# Patient Record
Sex: MALE | Race: BLACK OR AFRICAN AMERICAN | Employment: FULL TIME | RURAL
[De-identification: names, ages, dates, MRNs, and addresses within clinical notes are randomized per-mention and may not be internally consistent; named-entity substitution may affect disease eponyms.]

---

## 2018-01-04 ENCOUNTER — TELEPHONE (OUTPATIENT)
Dept: FAMILY MEDICINE CLINIC | Age: 38
End: 2018-01-04

## 2018-01-04 ENCOUNTER — OFFICE VISIT (OUTPATIENT)
Dept: FAMILY MEDICINE CLINIC | Age: 38
End: 2018-01-04

## 2018-01-04 VITALS
OXYGEN SATURATION: 97 % | RESPIRATION RATE: 18 BRPM | TEMPERATURE: 99.4 F | HEART RATE: 71 BPM | HEIGHT: 74 IN | WEIGHT: 245 LBS | SYSTOLIC BLOOD PRESSURE: 113 MMHG | BODY MASS INDEX: 31.44 KG/M2 | DIASTOLIC BLOOD PRESSURE: 56 MMHG

## 2018-01-04 DIAGNOSIS — K13.79 INFECTION OF BUCCAL SPACE: ICD-10-CM

## 2018-01-04 DIAGNOSIS — K04.7 DENTAL ABSCESS: Primary | ICD-10-CM

## 2018-01-04 RX ORDER — CIPROFLOXACIN 500 MG/1
500 TABLET ORAL 2 TIMES DAILY
Qty: 20 TAB | Refills: 0 | Status: SHIPPED | OUTPATIENT
Start: 2018-01-04 | End: 2018-01-14

## 2018-01-04 RX ORDER — METRONIDAZOLE 500 MG/1
500 TABLET ORAL 3 TIMES DAILY
Qty: 21 TAB | Refills: 0 | Status: SHIPPED | OUTPATIENT
Start: 2018-01-04 | End: 2018-01-11

## 2018-01-04 RX ORDER — PENICILLIN V POTASSIUM 500 MG/1
500 TABLET, FILM COATED ORAL 4 TIMES DAILY
Qty: 28 TAB | Refills: 0 | Status: SHIPPED | OUTPATIENT
Start: 2018-01-04 | End: 2018-01-11

## 2018-01-04 RX ORDER — LIDOCAINE HYDROCHLORIDE 20 MG/ML
15 SOLUTION OROPHARYNGEAL AS NEEDED
Qty: 1 BOTTLE | Refills: 0 | Status: SHIPPED | OUTPATIENT
Start: 2018-01-04 | End: 2018-06-19 | Stop reason: ALTCHOICE

## 2018-01-04 RX ORDER — AMOXICILLIN AND CLAVULANATE POTASSIUM 875; 125 MG/1; MG/1
1 TABLET, FILM COATED ORAL EVERY 12 HOURS
Qty: 20 TAB | Refills: 0 | Status: SHIPPED | OUTPATIENT
Start: 2018-01-04 | End: 2018-01-04 | Stop reason: ALTCHOICE

## 2018-01-04 NOTE — PROGRESS NOTES
Office called by patient and there was an issue with augmentin at the pharmacy.   Will give PCN and flagyl for equivalent coverage to the augmentin that Dr. Gila Daley prescribed today

## 2018-01-04 NOTE — PROGRESS NOTES
Isaias Lopez  42 y.o. male  1980  [phone]  <[id number]>     [facility] Practice: Progress Note       Encounter Date: 1/4/2018    Chief Complaint   Patient presents with    Mouth Pain     Cheek abcess? History of Present Illness   Isaias Lopez is a 42 y.o. male who presents to clinic today for:    Cheek pain  Patient presents with cc of cheek pain x 3 days. Believes that his wisdom tooth cut the right side of buccal mucosa an has become enlarge and. He has placing gel  \"kankanx\" on the area which numbs that area for approx 30-40 minutes. Denies fever, chills. Endorses possible drainage from the area and \"funny taste\" in his mouth. Review of Systems   Review of Systems   Constitutional: Negative for chills and fever. HENT: Positive for sore throat (mouth pain). Eyes: Negative for pain and discharge. Respiratory: Negative for cough, shortness of breath and wheezing. Gastrointestinal: Negative for abdominal pain, constipation, diarrhea, nausea and vomiting. Skin: Negative for itching and rash. Neurological: Negative for dizziness and headaches. Vitals/Objective:     Vitals:    01/04/18 1057   BP: 113/56   Pulse: 71   Resp: 18   Temp: 99.4 °F (37.4 °C)   TempSrc: Oral   SpO2: 97%   Weight: 245 lb (111.1 kg)   Height: 6' 2\" (1.88 m)     Body mass index is 31.46 kg/(m^2). Physical Exam   Constitutional: He is oriented to person, place, and time. He appears well-developed and well-nourished. No distress. HENT:   Head: Normocephalic and atraumatic. Mouth/Throat: Dental abscesses and dental caries present. No oropharyngeal exudate, posterior oropharyngeal edema, posterior oropharyngeal erythema or tonsillar abscesses. Eyes: Conjunctivae are normal. Pupils are equal, round, and reactive to light. Neck: Normal range of motion. Neck supple. Cardiovascular: Normal rate and regular rhythm.     Pulmonary/Chest: Breath sounds normal. Lymphadenopathy:        Head (right side): Submandibular and tonsillar adenopathy present. No preauricular and no occipital adenopathy present. Head (left side): No submental, no submandibular, no tonsillar, no preauricular, no posterior auricular and no occipital adenopathy present. He has no cervical adenopathy. Neurological: He is alert and oriented to person, place, and time. No results found for this or any previous visit (from the past 24 hour(s)). Assessment and Plan:     Encounter Diagnoses     ICD-10-CM ICD-9-CM   1. Dental abscess K04.7 522.5   2. Infection of buccal space K13.79 528.9       1. Dental abscess  2. Infection of buccal space  Advised patient to take abx and seek dentist for treatment of dental caries otherwise highly likely to reoccur.   - amoxicillin-clavulanate (AUGMENTIN) 875-125 mg per tablet; Take 1 Tab by mouth every twelve (12) hours for 10 days. Dispense: 20 Tab; Refill: 0  - lidocaine (XYLOCAINE) 2 % solution; Take 15 mL by mouth as needed for Pain. Swish and spit. Dispense: 1 Bottle; Refill: 0    I have discussed the diagnosis with the patient and the intended plan as seen in the above orders. he has expressed understanding. The patient has received an after-visit summary and questions were answered concerning future plans. I have discussed medication side effects and warnings with the patient as well. Electronically Signed: Christine Lara MD     History/Allergies   Patients past medical, surgical and family histories were reviewed and updated. History reviewed. No pertinent past medical history. History reviewed. No pertinent surgical history. Family History   Problem Relation Age of Onset    Cancer Mother      Social History     Social History    Marital status:      Spouse name: N/A    Number of children: N/A    Years of education: N/A     Occupational History    Not on file.      Social History Main Topics    Smoking status: Current Every Day Smoker     Packs/day: 0.25     Years: 15.00    Smokeless tobacco: Never Used    Alcohol use Yes      Comment: social    Drug use: No      Comment: use to smoke marijuana years ago    Sexual activity: Not Currently     Other Topics Concern    Not on file     Social History Narrative    ** Merged History Encounter **              No Known Allergies    Disposition     Follow-up Disposition:  Return if symptoms worsen or fail to improve. No future appointments. Current Medications after this visit     Current Outpatient Prescriptions   Medication Sig    amoxicillin-clavulanate (AUGMENTIN) 875-125 mg per tablet Take 1 Tab by mouth every twelve (12) hours for 10 days.  lidocaine (XYLOCAINE) 2 % solution Take 15 mL by mouth as needed for Pain. Swish and spit.  podofilox (CONDYLOX) 0.5 % external solution Apply twice daily to affected areas for 3 days. Then do not use for 4 days. Repeat this cycle up to 4 times until there are no more warts. No current facility-administered medications for this visit. There are no discontinued medications.

## 2018-01-04 NOTE — MR AVS SNAPSHOT
Visit Information Date & Time Provider Department Dept. Phone Encounter #  
 1/4/2018 10:40 AM Chacho Stern MD [address] [place] [address] [id number] Follow-up Instructions Return if symptoms worsen or fail to improve. Upcoming Health Maintenance Date Due Pneumococcal 19-64 Medium Risk (1 of 1 - PPSV23) 9/16/1997 DTaP/Tdap/Td series (1 - Tdap) 9/16/1999 Influenza Age 5 to Adult 8/1/2017 Allergies as of 1/4/2018  Review Complete On: 1/4/2018 By: Chacho Stern MD  
 No Known Allergies Current Immunizations  Never Reviewed No immunizations on file. Not reviewed this visit You Were Diagnosed With   
  
 Codes Comments Dental abscess    -  Primary ICD-10-CM: K04.7 ICD-9-CM: 522.5 Infection of buccal space     ICD-10-CM: K13.79 ICD-9-CM: 528.9 Vitals BP Pulse Temp Resp Height(growth percentile) Weight(growth percentile) 113/56 71 99.4 °F (37.4 °C) (Oral) 18 6' 2\" (1.88 m) 245 lb (111.1 kg) SpO2 BMI Smoking Status 97% 31.46 kg/m2 Current Every Day Smoker Vitals History BMI and BSA Data Body Mass Index Body Surface Area  
 31.46 kg/m 2 2.41 m 2 Preferred Pharmacy Pharmacy Name Phone [address] [place] [address] [phone] Your Updated Medication List  
  
   
This list is accurate as of: 1/4/18 11:21 AM.  Always use your most recent med list.  
  
  
  
  
 amoxicillin-clavulanate 875-125 mg per tablet Commonly known as:  AUGMENTIN Take 1 Tab by mouth every twelve (12) hours for 10 days. podofilox 0.5 % external solution Commonly known as:  Dora Certain Apply twice daily to affected areas for 3 days. Then do not use for 4 days. Repeat this cycle up to 4 times until there are no more warts. Prescriptions Sent to Pharmacy Refills amoxicillin-clavulanate (AUGMENTIN) 875-125 mg per tablet 0 Sig: Take 1 Tab by mouth every twelve (12) hours for 10 days. Class: Normal  
 Pharmacy: [facility] DR IVAN BROWN [address] [place] [address] #: [phone] Route: Oral  
  
Follow-up Instructions Return if symptoms worsen or fail to improve. Introducing [facility]! Yeny Celaya introduces Blizuu patient portal. Now you can access parts of your medical record, email your doctor's office, and request medication refills online. 1. In your internet browser, go to [email or web address] 2. Click on the First Time User? Click Here link in the Sign In box. You will see the New Member Sign Up page. 3. Enter your Blizuu Access Code exactly as it appears below. You will not need to use this code after youve completed the sign-up process. If you do not sign up before the expiration date, you must request a new code. · Blizuu Access Code: [email or web address] Expires: 4/4/2018 10:53 AM 
 
4. Enter the last four digits of your Social Security Number (xxxx) and Date of Birth (mm/dd/yyyy) as indicated and click Submit. You will be taken to the next sign-up page. 5. Create a Blizuu ID. This will be your Blizuu login ID and cannot be changed, so think of one that is secure and easy to remember. 6. Create a Blizuu password. You can change your password at any time. 7. Enter your Password Reset Question and Answer. This can be used at a later time if you forget your password. 8. Enter your e-mail address. You will receive e-mail notification when new information is available in [address] 9. Click Sign Up. You can now view and download portions of your medical record. 10. Click the Download Summary menu link to download a portable copy of your medical information.  
 
If you have questions, please visit the Frequently Asked Questions section of the [employer]. Remember, Eyepict is NOT to be used for urgent needs. For medical emergencies, dial 911. Now available from your iPhone and Android! Please provide this summary of care documentation to your next provider. Your primary care clinician is listed as NONE. If you have any questions after today's visit, please call [phone].

## 2018-01-04 NOTE — PROGRESS NOTES
1. Have you been to the ER, urgent care clinic, or been hospitalized since your last visit? No      2. Have you seen or consulted any other health care providers outside of the [address] [place] [address] since your last visit?   No   Reviewed record in preparation for visit and have necessary documentation  opportunity was given for questions  Goals that were addressed and/or need to be completed during or after this appointment include     Health Maintenance Due   Topic Date Due    Pneumococcal 19-64 Medium Risk (1 of 1 - PPSV23) 09/16/1997    DTaP/Tdap/Td series (1 - Tdap) 09/16/1999    Influenza Age 9 to Adult  08/01/2017

## 2018-06-19 ENCOUNTER — OFFICE VISIT (OUTPATIENT)
Dept: FAMILY MEDICINE CLINIC | Age: 38
End: 2018-06-19

## 2018-06-19 VITALS
BODY MASS INDEX: 31.32 KG/M2 | HEIGHT: 74 IN | SYSTOLIC BLOOD PRESSURE: 109 MMHG | TEMPERATURE: 98.6 F | WEIGHT: 244 LBS | OXYGEN SATURATION: 97 % | RESPIRATION RATE: 18 BRPM | DIASTOLIC BLOOD PRESSURE: 71 MMHG | HEART RATE: 102 BPM

## 2018-06-19 DIAGNOSIS — S05.02XA ABRASION OF LEFT CORNEA, INITIAL ENCOUNTER: Primary | ICD-10-CM

## 2018-06-19 DIAGNOSIS — H10.9 BACTERIAL CONJUNCTIVITIS OF LEFT EYE: ICD-10-CM

## 2018-06-19 RX ORDER — ERYTHROMYCIN 5 MG/G
OINTMENT OPHTHALMIC
Qty: 1 TUBE | Refills: 0 | Status: SHIPPED | OUTPATIENT
Start: 2018-06-19 | End: 2019-02-05

## 2018-06-19 NOTE — PATIENT INSTRUCTIONS
Corneal Scratches: Care Instructions  Your Care Instructions    The cornea is the clear surface that covers the front of the eye. When a speck of dirt, a wood chip, an insect, or another object flies into your eye, it can cause a painful scratch on the cornea. Wearing contact lenses too long or rubbing your eyes can also scratch the cornea. Small scratches usually heal in a day or two. Deeper scratches may take longer. If you have had a foreign object removed from your eye or you have a corneal scratch, you will need to watch for infection and vision problems while your eye heals. Follow-up care is a key part of your treatment and safety. Be sure to make and go to all appointments, and call your doctor if you are having problems. It's also a good idea to know your test results and keep a list of the medicines you take. How can you care for yourself at home? · The doctor probably used a medicine during your exam to numb your eye. When it wears off in 30 to 60 minutes, your eye pain may come back. Take pain medicines exactly as directed. ¨ If the doctor gave you a prescription medicine for pain, take it as prescribed. ¨ If you are not taking a prescription pain medicine, ask your doctor if you can take an over-the-counter medicine. ¨ Do not take two or more pain medicines at the same time unless the doctor told you to. Many pain medicines have acetaminophen, which is Tylenol. Too much acetaminophen (Tylenol) can be harmful. · Do not rub your injured eye. Rubbing can make it worse. · Use the prescribed eyedrops or ointment as directed. Be sure the dropper or bottle tip is clean. To put in eyedrops or ointment:  ¨ Tilt your head back, and pull your lower eyelid down with one finger. ¨ Drop or squirt the medicine inside the lower lid. ¨ Close your eye for 30 to 60 seconds to let the drops or ointment move around. ¨ Do not touch the ointment or dropper tip to your eyelashes or any other surface.   · Do not use your contact lens in your hurt eye until your doctor says you can. Also, do not wear eye makeup until your eye has healed. · Do not drive if you have blurred vision. · Bright light may hurt. Sunglasses can help. · To prevent eye injuries in the future, wear safety glasses or goggles when you work with machines or tools, mow the lawn, or ride a bike or motorcycle. When should you call for help? Call your doctor now or seek immediate medical care if:  ? · You have signs of an eye infection, such as:  ¨ Pus or thick discharge coming from the eye. ¨ Redness or swelling around the eye. ¨ A fever. ? · You have new or worse eye pain. ? · You have vision changes. ? · It feels like there is something in your eye. ? · Light hurts your eye. ? Watch closely for changes in your health, and be sure to contact your doctor if:  ? · You do not get better as expected. Where can you learn more? Go to http://hayder-treasure.info/. Enter K609 in the search box to learn more about \"Corneal Scratches: Care Instructions. \"  Current as of: March 3, 2017  Content Version: 11.4  © 7078-4797 [employer]. Care instructions adapted under license by [employer] (which disclaims liability or warranty for this information). If you have questions about a medical condition or this instruction, always ask your healthcare professional. [place] [address] any warranty or liability for your use of this information. Pinkeye: Care Instructions  Your Care Instructions    Pinkeye is redness and swelling of the eye surface and the conjunctiva (the lining of the eyelid and the covering of the white part of the eye). Pinkeye is also called conjunctivitis. Pinkeye is often caused by infection with bacteria or a virus. Dry air, allergies, smoke, and chemicals are other common causes. Pinkeye often clears on its own in 7 to 10 days.  Antibiotics only help if the pinkeye is caused by bacteria. Pinkeye caused by infection spreads easily. If an allergy or chemical is causing pinkeye, it will not go away unless you can avoid whatever is causing it. Follow-up care is a key part of your treatment and safety. Be sure to make and go to all appointments, and call your doctor if you are having problems. It's also a good idea to know your test results and keep a list of the medicines you take. How can you care for yourself at home? · Wash your hands often. Always wash them before and after you treat pinkeye or touch your eyes or face. · Use moist cotton or a clean, wet cloth to remove crust. Wipe from the inside corner of the eye to the outside. Use a clean part of the cloth for each wipe. · Put cold or warm wet cloths on your eye a few times a day if the eye hurts. · Do not wear contact lenses or eye makeup until the pinkeye is gone. Throw away any eye makeup you were using when you got pinkeye. Clean your contacts and storage case. If you wear disposable contacts, use a new pair when your eye has cleared and it is safe to wear contacts again. · If the doctor gave you antibiotic ointment or eyedrops, use them as directed. Use the medicine for as long as instructed, even if your eye starts looking better soon. Keep the bottle tip clean, and do not let it touch the eye area. · To put in eyedrops or ointment:  ¨ Tilt your head back, and pull your lower eyelid down with one finger. ¨ Drop or squirt the medicine inside the lower lid. ¨ Close your eye for 30 to 60 seconds to let the drops or ointment move around. ¨ Do not touch the ointment or dropper tip to your eyelashes or any other surface. · Do not share towels, pillows, or washcloths while you have pinkeye. When should you call for help? Call your doctor now or seek immediate medical care if:  ? · You have pain in your eye, not just irritation on the surface. ? · You have a change in vision or loss of vision.    ? · You have an increase in discharge from the eye.   ? · Your eye has not started to improve or begins to get worse within 48 hours after you start using antibiotics. ? · Pinkeye lasts longer than 7 days. ? Watch closely for changes in your health, and be sure to contact your doctor if you have any problems. Where can you learn more? Go to http://hayder-treasure.info/. Enter Y392 in the search box to learn more about \"Pinkeye: Care Instructions. \"  Current as of: March 20, 2017  Content Version: 11.4  © 8575-7889 [employer]. Care instructions adapted under license by [employer] (which disclaims liability or warranty for this information). If you have questions about a medical condition or this instruction, always ask your healthcare professional. [address] any warranty or liability for your use of this information.

## 2018-06-19 NOTE — MR AVS SNAPSHOT
[address] [address] 
[phone] Patient: Ang Naik MRN: [id number] DHV:[id number] Visit Information Date & Time Provider Department Dept. Phone Encounter #  
 6/19/2018 10:30 AM Dayron Montesinos  [facility] [phone] [id number] Follow-up Instructions Return if symptoms worsen or fail to improve. Upcoming Health Maintenance Date Due Pneumococcal 19-64 Medium Risk (1 of 1 - PPSV23) 9/16/1997 DTaP/Tdap/Td series (1 - Tdap) 9/16/1999 Influenza Age 5 to Adult 8/1/2018 Allergies as of 6/19/2018  Review Complete On: 6/19/2018 By: Janette Pederson LPN No Known Allergies Current Immunizations  Never Reviewed No immunizations on file. Not reviewed this visit You Were Diagnosed With   
  
 Codes Comments Abrasion of left cornea, initial encounter    -  Primary ICD-10-CM: S05. 02XA ICD-9-CM: 918.1 Bacterial conjunctivitis of left eye     ICD-10-CM: H10.9 ICD-9-CM: 372.39, 041.9 Vitals BP Pulse Temp Resp Height(growth percentile) Weight(growth percentile) 109/71 (!) 102 98.6 °F (37 °C) (Oral) 18 6' 2\" (1.88 m) 244 lb (110.7 kg) SpO2 BMI Smoking Status 97% 31.33 kg/m2 Current Every Day Smoker Vitals History BMI and BSA Data Body Mass Index Body Surface Area  
 31.33 kg/m 2 2.4 m 2 Preferred Pharmacy Pharmacy Name Phone [address] [address] [phone] Your Updated Medication List  
  
   
This list is accurate as of 6/19/18 11:20 AM.  Always use your most recent med list.  
  
  
  
  
 erythromycin ophthalmic ointment Commonly known as:  ILOTYCIN Instill ~1 cm ribbon into left eye up to 4 times daily for 10 days. lidocaine 2 % solution Commonly known as:  XYLOCAINE  
 Take 15 mL by mouth as needed for Pain. Swish and spit.  
  
 podofilox 0.5 % external solution Commonly known as:  Lindy Brandon Apply twice daily to affected areas for 3 days. Then do not use for 4 days. Repeat this cycle up to 4 times until there are no more warts. Prescriptions Sent to Pharmacy Refills  
 erythromycin (ILOTYCIN) ophthalmic ointment 0 Sig: Instill ~1 cm ribbon into left eye up to 4 times daily for 10 days. Class: Normal  
 Pharmacy: [address] [place] [address] #: [phone] Follow-up Instructions Return if symptoms worsen or fail to improve. Patient Instructions Corneal Scratches: Care Instructions Your Care Instructions The cornea is the clear surface that covers the front of the eye. When a speck of dirt, a wood chip, an insect, or another object flies into your eye, it can cause a painful scratch on the cornea. Wearing contact lenses too long or rubbing your eyes can also scratch the cornea. Small scratches usually heal in a day or two. Deeper scratches may take longer. If you have had a foreign object removed from your eye or you have a corneal scratch, you will need to watch for infection and vision problems while your eye heals. Follow-up care is a key part of your treatment and safety. Be sure to make and go to all appointments, and call your doctor if you are having problems. It's also a good idea to know your test results and keep a list of the medicines you take. How can you care for yourself at home? · The doctor probably used a medicine during your exam to numb your eye. When it wears off in 30 to 60 minutes, your eye pain may come back. Take pain medicines exactly as directed. ¨ If the doctor gave you a prescription medicine for pain, take it as prescribed. ¨ If you are not taking a prescription pain medicine, ask your doctor if you can take an over-the-counter medicine. ¨ Do not take two or more pain medicines at the same time unless the doctor told you to. Many pain medicines have acetaminophen, which is Tylenol. Too much acetaminophen (Tylenol) can be harmful. · Do not rub your injured eye. Rubbing can make it worse. · Use the prescribed eyedrops or ointment as directed. Be sure the dropper or bottle tip is clean. To put in eyedrops or ointment: ¨ Tilt your head back, and pull your lower eyelid down with one finger. ¨ Drop or squirt the medicine inside the lower lid. ¨ Close your eye for 30 to 60 seconds to let the drops or ointment move around. ¨ Do not touch the ointment or dropper tip to your eyelashes or any other surface. · Do not use your contact lens in your hurt eye until your doctor says you can. Also, do not wear eye makeup until your eye has healed. · Do not drive if you have blurred vision. · Bright light may hurt. Sunglasses can help. · To prevent eye injuries in the future, wear safety glasses or goggles when you work with machines or tools, mow the lawn, or ride a bike or motorcycle. When should you call for help? Call your doctor now or seek immediate medical care if: 
? · You have signs of an eye infection, such as: 
¨ Pus or thick discharge coming from the eye. ¨ Redness or swelling around the eye. ¨ A fever. ? · You have new or worse eye pain. ? · You have vision changes. ? · It feels like there is something in your eye. ? · Light hurts your eye. ? Watch closely for changes in your health, and be sure to contact your doctor if: 
? · You do not get better as expected. Where can you learn more? Go to http://hayder-treasure.info/. Enter B123 in the search box to learn more about \"Corneal Scratches: Care Instructions. \" Current as of: March 3, 2017 Content Version: 11.4 © 8493-2108 [employer].  Care instructions adapted under license by [employer] (which disclaims liability or warranty for this information). If you have questions about a medical condition or this instruction, always ask your healthcare professional. [address] any warranty or liability for your use of this information. Pinkeye: Care Instructions Your Care Instructions Pinkeye is redness and swelling of the eye surface and the conjunctiva (the lining of the eyelid and the covering of the white part of the eye). Pinkeye is also called conjunctivitis. Pinkeye is often caused by infection with bacteria or a virus. Dry air, allergies, smoke, and chemicals are other common causes. Pinkeye often clears on its own in 7 to 10 days. Antibiotics only help if the pinkeye is caused by bacteria. Pinkeye caused by infection spreads easily. If an allergy or chemical is causing pinkeye, it will not go away unless you can avoid whatever is causing it. Follow-up care is a key part of your treatment and safety. Be sure to make and go to all appointments, and call your doctor if you are having problems. It's also a good idea to know your test results and keep a list of the medicines you take. How can you care for yourself at home? · Wash your hands often. Always wash them before and after you treat pinkeye or touch your eyes or face. · Use moist cotton or a clean, wet cloth to remove crust. Wipe from the inside corner of the eye to the outside. Use a clean part of the cloth for each wipe. · Put cold or warm wet cloths on your eye a few times a day if the eye hurts. · Do not wear contact lenses or eye makeup until the pinkeye is gone. Throw away any eye makeup you were using when you got pinkeye. Clean your contacts and storage case. If you wear disposable contacts, use a new pair when your eye has cleared and it is safe to wear contacts again. · If the doctor gave you antibiotic ointment or eyedrops, use them as directed.  Use the medicine for as long as instructed, even if your eye starts looking better soon. Keep the bottle tip clean, and do not let it touch the eye area. · To put in eyedrops or ointment: ¨ Tilt your head back, and pull your lower eyelid down with one finger. ¨ Drop or squirt the medicine inside the lower lid. ¨ Close your eye for 30 to 60 seconds to let the drops or ointment move around. ¨ Do not touch the ointment or dropper tip to your eyelashes or any other surface. · Do not share towels, pillows, or washcloths while you have pinkeye. When should you call for help? Call your doctor now or seek immediate medical care if: 
? · You have pain in your eye, not just irritation on the surface. ? · You have a change in vision or loss of vision. ? · You have an increase in discharge from the eye.  
? · Your eye has not started to improve or begins to get worse within 48 hours after you start using antibiotics. ? · Pinkeye lasts longer than 7 days. ? Watch closely for changes in your health, and be sure to contact your doctor if you have any problems. Where can you learn more? Go to http://hayder-treasure.info/. Enter Y392 in the search box to learn more about \"Pinkeye: Care Instructions. \" Current as of: March 20, 2017 Content Version: 11.4 © 2223-9830 [employer]wise[employer]. Care instructions adapted under license by [employer] (which disclaims liability or warranty for this information). If you have questions about a medical condition or this instruction, always ask your healthcare professional. [address] [address] any warranty or liability for your use of this information. Introducing [facility]! Hitesh Kuhn introduces BlueLithium patient portal. Now you can access parts of your medical record, email your doctor's office, and request medication refills online. 1. In your internet browser, go to [email or web address] 2. Click on the First Time User? Click Here link in the Sign In box. You will see the New Member Sign Up page. 3. Enter your [facility] Access Code exactly as it appears below. You will not need to use this code after youve completed the sign-up process. If you do not sign up before the expiration date, you must request a new code. · [facility] Access Code: [email or web address] Expires: 9/17/2018 11:20 AM 
 
4. Enter the last four digits of your Social Security Number (xxxx) and Date of Birth (mm/dd/yyyy) as indicated and click Submit. You will be taken to the next sign-up page. 5. Create a [facility] ID. This will be your [facility] login ID and cannot be changed, so think of one that is secure and easy to remember. 6. Create a [facility] password. You can change your password at any time. 7. Enter your Password Reset Question and Answer. This can be used at a later time if you forget your password. 8. Enter your e-mail address. You will receive e-mail notification when new information is available in [address] 9. Click Sign Up. You can now view and download portions of your medical record. 10. Click the Download Summary menu link to download a portable copy of your medical information. If you have questions, please visit the Frequently Asked Questions section of the [facility] website. Remember, [facility] is NOT to be used for urgent needs. For medical emergencies, dial 911. Now available from your iPhone and Android! Please provide this summary of care documentation to your next provider. Your primary care clinician is listed as NONE. If you have any questions after today's visit, please call [phone].

## 2018-06-19 NOTE — PROGRESS NOTES
1. Have you been to the ER, urgent care clinic, or been hospitalized since your last visit? No     2. Have you seen or consulted any other health care providers outside of the [facility] [place] since your last visit?   No     Reviewed record in preparation for visit and have necessary documentation  opportunity was given for questions  Goals that were addressed and/or need to be completed during or after this appointment include     Health Maintenance Due   Topic Date Due    Pneumococcal 19-64 Medium Risk (1 of 1 - PPSV23) 09/16/1997    DTaP/Tdap/Td series (1 - Tdap) 09/16/1999

## 2018-06-19 NOTE — PROGRESS NOTES
Rosemary Cardenas  42 y.o. male  1980 [address] [facility]  <[id number]>     [facility] Practice: Progress Note       Encounter Date: 6/19/2018    Chief Complaint   Patient presents with    Eye Drainage     Left eye drainage x2 days, redness (did remember he scratched his eye with his fingernail the other day)       History provided by patient  History of Present Illness   Rosemary Cardenas is a 42 y.o. male who presents to clinic today for:    Left eye drainage  Patient present with cc of left eye drainage x 2 days. Reports that prior to sympomts starting, he thought that he scratched his left eye with his nail. Since then has been having sensation of foreign body in eye, drainage and redness. Had attempted to irrigate eye with water with minimal improvement. Review of Systems   Review of Systems   Constitutional: Negative for chills and fever. Eyes: Positive for pain, discharge and redness. Skin: Negative for itching and rash. Neurological: Negative for headaches. Vitals/Objective:     Vitals:    06/19/18 1053   BP: 109/71   Pulse: (!) 102   Resp: 18   Temp: 98.6 °F (37 °C)   TempSrc: Oral   SpO2: 97%   Weight: 244 lb (110.7 kg)   Height: 6' 2\" (1.88 m)     Body mass index is 31.33 kg/(m^2). Wt Readings from Last 3 Encounters:   06/19/18 244 lb (110.7 kg)   01/04/18 245 lb (111.1 kg)   07/13/16 245 lb (111.1 kg)       Physical Exam   Constitutional: He appears well-developed and well-nourished. Eyes: EOM are normal. Pupils are equal, round, and reactive to light. Right eye exhibits no chemosis, no discharge, no exudate and no hordeolum. No foreign body present in the right eye. Left eye exhibits discharge. Left eye exhibits no chemosis, no exudate and no hordeolum. No foreign body present in the left eye. Right conjunctiva is not injected. Right conjunctiva has no hemorrhage. Left conjunctiva is injected. Left conjunctiva has no hemorrhage. No scleral icterus. Slit lamp exam:       The right eye shows no corneal flare and no corneal ulcer. The left eye shows corneal abrasion and fluorescein uptake. The left eye shows no corneal flare, no corneal ulcer and no foreign body. No results found for this or any previous visit (from the past 24 hour(s)). Assessment and Plan:     Encounter Diagnoses     ICD-10-CM ICD-9-CM   1. Abrasion of left cornea, initial encounter S05. 02XA 918.1   2. Bacterial conjunctivitis of left eye H10.9 372.39     041.9       1. Abrasion of left cornea, initial encounter  2. Bacterial conjunctivitis of left eye  - erythromycin (ILOTYCIN) ophthalmic ointment; Instill ~1 cm ribbon into left eye up to 4 times daily for 10 days. Dispense: 1 Tube; Refill: 0    I have discussed the diagnosis with the patient and the intended plan as seen in the above orders. he has expressed understanding. The patient has received an after-visit summary and questions were answered concerning future plans. I have discussed medication side effects and warnings with the patient as well. Electronically Signed: Josephine Ugarte MD     History/Allergies   Patients past medical, surgical and family histories were reviewed and updated. History reviewed. No pertinent past medical history. History reviewed. No pertinent surgical history. Family History   Problem Relation Age of Onset    Cancer Mother      Social History     Social History    Marital status:      Spouse name: N/A    Number of children: N/A    Years of education: N/A     Occupational History    Not on file.      Social History Main Topics    Smoking status: Current Every Day Smoker     Packs/day: 0.25     Years: 15.00    Smokeless tobacco: Never Used    Alcohol use Yes      Comment: social    Drug use: No      Comment: use to smoke marijuana years ago    Sexual activity: Not Currently     Other Topics Concern    Not on file     Social History Narrative    ** Merged History Encounter **              No Known Allergies    Disposition     Follow-up Disposition:  Return if symptoms worsen or fail to improve. No future appointments. Current Medications after this visit     Current Outpatient Prescriptions   Medication Sig    erythromycin (ILOTYCIN) ophthalmic ointment Instill ~1 cm ribbon into left eye up to 4 times daily for 10 days. No current facility-administered medications for this visit.       Medications Discontinued During This Encounter   Medication Reason    lidocaine (XYLOCAINE) 2 % solution Therapy Completed    podofilox (CONDYLOX) 0.5 % external solution Therapy Completed

## 2019-02-05 ENCOUNTER — OFFICE VISIT (OUTPATIENT)
Dept: FAMILY MEDICINE CLINIC | Age: 39
End: 2019-02-05

## 2019-02-05 VITALS
WEIGHT: 242 LBS | TEMPERATURE: 98.7 F | RESPIRATION RATE: 16 BRPM | OXYGEN SATURATION: 96 % | BODY MASS INDEX: 31.06 KG/M2 | SYSTOLIC BLOOD PRESSURE: 126 MMHG | DIASTOLIC BLOOD PRESSURE: 81 MMHG | HEIGHT: 74 IN | HEART RATE: 102 BPM

## 2019-02-05 DIAGNOSIS — B96.89 BACTERIAL CONJUNCTIVITIS OF BOTH EYES: Primary | ICD-10-CM

## 2019-02-05 DIAGNOSIS — H10.9 BACTERIAL CONJUNCTIVITIS OF BOTH EYES: Primary | ICD-10-CM

## 2019-02-05 RX ORDER — ERYTHROMYCIN 5 MG/G
OINTMENT OPHTHALMIC
Qty: 3.5 G | Refills: 0 | Status: SHIPPED | OUTPATIENT
Start: 2019-02-05 | End: 2019-08-08 | Stop reason: ALTCHOICE

## 2019-02-05 NOTE — LETTER
NOTIFICATION RETURN TO WORK / SCHOOL 
 
2/5/2019 4:45 PM 
 
Mr. Charisma Call [address]. To Whom It May Concern: 
 
Charisma Call is currently under the care of Citlalli Bowers. He will return to work/school on: 2/6/19 If there are questions or concerns please have the patient contact our office.  
 
 
 
Sincerely, 
 
 
Paulino Patel MD

## 2019-02-05 NOTE — PROGRESS NOTES
[facility] Subjective:  
Rowan Treadwell is a 34 y.o. male with no significant past medical history CC:Pink eye History provided by patient HPI: 
 
Patient presents to clinic complaining of pink eye. He states that yesterday, co-worker noticed yes were teary. When he woke up this morning, his wife noted left pink eye, which progressively spread to right eye during the day. Pink eye associated with significant discharge, burning sensation and itching bilaterally. He states that daughter was complaining of eye pain few days ago, but never had pink eye. He denies fever, chills, sinus pain, ear pain, runny nose, cough, N/V. No other complaint at this visit. No current outpatient medications on file prior to visit. No current facility-administered medications on file prior to visit. There is no problem list on file for this patient. Social History Socioeconomic History  Marital status:  Spouse name: Not on file  Number of children: Not on file  Years of education: Not on file  Highest education level: Not on file Social Needs  Financial resource strain: Not on file  Food insecurity - worry: Not on file  Food insecurity - inability: Not on file  Transportation needs - medical: Not on file  Transportation needs - non-medical: Not on file Occupational History  Not on file Tobacco Use  Smoking status: Current Every Day Smoker Packs/day: 0.25 Years: 15.00 Pack years: 3.75  Smokeless tobacco: Never Used Substance and Sexual Activity  Alcohol use: Yes Comment: social  
 Drug use: No  
  Comment: use to smoke marijuana years ago  Sexual activity: Not Currently Other Topics Concern  Not on file Social History Narrative ** Merged History Encounter ** Review of Systems Constitutional: Negative for fever. HENT: Negative for congestion, ear pain, sinus pain and sore throat. Eyes: Positive for discharge and redness. Respiratory: Negative for cough and wheezing. Cardiovascular: Negative for chest pain and palpitations. Gastrointestinal: Negative for nausea and vomiting. Skin: Negative for itching. Objective:  
 
Visit Vitals /81 (BP 1 Location: Right arm, BP Patient Position: Sitting) Pulse (!) 102 Temp 98.7 °F (37.1 °C) (Oral) Resp 16 Ht 6' 2\" (1.88 m) Wt 242 lb (109.8 kg) SpO2 96% BMI 31.07 kg/m² Physical Exam  
Constitutional: No distress. HENT:  
Head: Normocephalic and atraumatic. Eyes: EOM are normal. Pupils are equal, round, and reactive to light. Right eye exhibits discharge. Left eye exhibits discharge. erythematous conjunctiva b/l, with white crusted discharge on both eyes Neck: Normal range of motion. Cardiovascular: Normal rate, regular rhythm and normal heart sounds. Pulmonary/Chest: Effort normal and breath sounds normal. No respiratory distress. He has no wheezes. Lymphadenopathy:  
  He has no cervical adenopathy. Skin: He is not diaphoretic.  
 
-Vision acuity 20/15 Pertinent Labs/Studies: N/A Assessment and orders:  
 
Pt is 38yro who presents to clinic for management of pink eye. ICD-10-CM ICD-9-CM 1. Bacterial conjunctivitis of both eyes H10.9 372.30 erythromycin (ILOTYCIN) ophthalmic ointment Diagnoses and all orders for this visit: 
 
1. Bacterial conjunctivitis of both eyes 
-     erythromycin (ILOTYCIN) ophthalmic ointment; 1cm ribbon applied up to 4 times daily for 10 days Patient knows to see immediate medical attention in case condition worsens. Follow-up Disposition: 
Return if symptoms worsen or fail to improve. I have reviewed patient medical and social history and medications. I have reviewed pertinent labs results and other data.  I have discussed the diagnosis with the patient and the intended plan as seen in the above orders. The patient has received an after-visit summary and questions were answered concerning future plans. I have discussed medication side effects and warnings with the patient as well. Paulino Patel MD 
Resident [facility]STA [facility] GHULAM MARRERO [facility] 02/05/19

## 2019-02-05 NOTE — PATIENT INSTRUCTIONS
A Healthy Lifestyle: Care Instructions Your Care Instructions A healthy lifestyle can help you feel good, stay at a healthy weight, and have plenty of energy for both work and play. A healthy lifestyle is something you can share with your whole family. A healthy lifestyle also can lower your risk for serious health problems, such as high blood pressure, heart disease, and diabetes. You can follow a few steps listed below to improve your health and the health of your family. Follow-up care is a key part of your treatment and safety. Be sure to make and go to all appointments, and call your doctor if you are having problems. It's also a good idea to know your test results and keep a list of the medicines you take. How can you care for yourself at home? · Do not eat too much sugar, fat, or fast foods. You can still have dessert and treats now and then. The goal is moderation. · Start small to improve your eating habits. Pay attention to portion sizes, drink less juice and soda pop, and eat more fruits and vegetables. ? Eat a healthy amount of food. A 3-ounce serving of meat, for example, is about the size of a deck of cards. Fill the rest of your plate with vegetables and whole grains. ? Limit the amount of soda and sports drinks you have every day. Drink more water when you are thirsty. ? Eat at least 5 servings of fruits and vegetables every day. It may seem like a lot, but it is not hard to reach this goal. A serving or helping is 1 piece of fruit, 1 cup of vegetables, or 2 cups of leafy, raw vegetables. Have an apple or some carrot sticks as an afternoon snack instead of a candy bar. Try to have fruits and/or vegetables at every meal. 
· Make exercise part of your daily routine. You may want to start with simple activities, such as walking, bicycling, or slow swimming. Try to be active 30 to 60 minutes every day.  You do not need to do all 30 to 60 minutes all at once. For example, you can exercise 3 times a day for 10 or 20 minutes. Moderate exercise is safe for most people, but it is always a good idea to talk to your doctor before starting an exercise program. 
· Keep moving. [place]as [place] the lawn, work in the garden, or [employer]. Take the stairs instead of the elevator at work. · If you smoke, quit. People who smoke have an increased risk for heart attack, stroke, cancer, and other lung illnesses. Quitting is hard, but there are ways to boost your chance of quitting tobacco for good. ? Use nicotine gum, patches, or lozenges. ? Ask your doctor about stop-smoking programs and medicines. ? Keep trying. In addition to reducing your risk of diseases in the future, you will notice some benefits soon after you stop using tobacco. If you have shortness of breath or asthma symptoms, they will likely get better within a few weeks after you quit. · Limit how much alcohol you drink. Moderate amounts of alcohol (up to 2 drinks a day for men, 1 drink a day for women) are okay. But drinking too much can lead to liver problems, high blood pressure, and other health problems. Family health If you have a family, there are many things you can do together to improve your health. · Eat meals together as a family as often as possible. · Eat healthy foods. This includes fruits, vegetables, lean meats and dairy, and whole grains. · Include your family in your fitness plan. Most people think of activities such as jogging or tennis as the way to fitness, but there are many ways you and your family can be more active. Anything that makes you breathe hard and gets your heart pumping is exercise. Here are some tips: 
? Walk to do errands or to take your child to school or the bus. 
? Go for a family bike ride after dinner instead of watching TV. Where can you learn more? Go to http://hayder-treasure.info/. Enter O207 in the search box to learn more about \"A Healthy Lifestyle: Care Instructions. \" Current as of: September 11, 2018 Content Version: 11.9 © 7790-6990 [employer]. Care instructions adapted under license by [employer] (which disclaims liability or warranty for this information). If you have questions about a medical condition or this instruction, always ask your healthcare professional. [place] [address] any warranty or liability for your use of this information.

## 2019-02-05 NOTE — LETTER
NOTIFICATION RETURN TO WORK / SCHOOL 
 
2/5/2019 4:47 PM 
 
Mr. Charisma Call [address]. To Whom It May Concern: 
 
Charisma Call is currently under the care of Citlalli Bowers. He will return to work/school on: 2/7/19 If there are questions or concerns please have the patient contact our office.  
 
 
 
Sincerely, 
 
 
Paulino Patel MD

## 2019-02-05 NOTE — PROGRESS NOTES
1. Have you been to the ER, urgent care clinic since your last visit? Hospitalized since your last visit? no 
 
2. Have you seen or consulted any other health care providers outside of the [address] [place] [address] since your last visit? Include any pap smears or colon screening. no 
Reviewed record in preparation for visit and have obtained necessary documentation. Patient did not bring medications to visit for review. Information provided on Advanced Directive, Living Will. Body mass index is 31.07 kg/m². Health Maintenance Due Topic Date Due  Pneumococcal 19-64 Medium Risk (1 of 1 - PPSV23) 09/16/1997  
 DTaP/Tdap/Td series (1 - Tdap) 09/16/1999  Influenza Age 5 to Adult  08/01/2018

## 2019-08-06 ENCOUNTER — OFFICE VISIT (OUTPATIENT)
Dept: FAMILY MEDICINE CLINIC | Age: 39
End: 2019-08-06

## 2019-08-06 VITALS
OXYGEN SATURATION: 96 % | BODY MASS INDEX: 29.44 KG/M2 | RESPIRATION RATE: 20 BRPM | HEART RATE: 97 BPM | SYSTOLIC BLOOD PRESSURE: 106 MMHG | HEIGHT: 74 IN | DIASTOLIC BLOOD PRESSURE: 70 MMHG | TEMPERATURE: 98.3 F | WEIGHT: 229.4 LBS

## 2019-08-06 DIAGNOSIS — R07.9 CHEST PAIN, UNSPECIFIED TYPE: ICD-10-CM

## 2019-08-06 DIAGNOSIS — R01.1 HEART MURMUR: ICD-10-CM

## 2019-08-06 DIAGNOSIS — M54.2 NECK PAIN: Primary | ICD-10-CM

## 2019-08-06 NOTE — PATIENT INSTRUCTIONS
Costochondritis: Care Instructions  Your Care Instructions  You have chest pain because the cartilage of your rib cage is inflamed. This problem is called costochondritis. This type of chest wall pain may last from days to weeks. It is not a heart problem. Sometimes costochondritis occurs with a cold or the flu, and other times the exact cause is not known. Follow-up care is a key part of your treatment and safety. Be sure to make and go to all appointments, and call your doctor if you are having problems. It's also a good idea to know your test results and keep a list of the medicines you take. How can you care for yourself at home? · Take medicines for pain and inflammation exactly as directed. ? If the doctor gave you a prescription medicine, take it as prescribed. ? If you are not taking a prescription pain medicine, ask your doctor if you can take an over-the-counter medicine. ? Do not take two or more pain medicines at the same time unless the doctor told you to. Many pain medicines have acetaminophen, which is Tylenol. Too much acetaminophen (Tylenol) can be harmful. · It may help to use a warm compress or heating pad (set on low) on your chest. You can also try alternating heat and ice. Put ice or a cold pack on the area for 10 to 20 minutes at a time. Put a thin cloth between the ice and your skin. · Avoid any activity that strains the chest area. As your pain gets better, you can slowly return to your normal activities. · Do not use tape, an elastic bandage, a \"rib belt,\" or anything else that restricts your chest wall motion. When should you call for help? Call 911 anytime you think you may need emergency care. For example, call if:    · You have new or different chest pain or pressure. This may occur with:  ? Sweating. ? Shortness of breath. ? Nausea or vomiting. ? Pain that spreads from the chest to the neck, jaw, or one or both shoulders or arms. ? Dizziness or lightheadedness. ?  A fast or uneven pulse. After calling 911, chew 1 adult-strength aspirin. Wait for an ambulance. Do not try to drive yourself.     · You have severe trouble breathing.    Call your doctor now or seek immediate medical care if:    · You have a fever or cough.     · You have any trouble breathing.     · Your chest pain gets worse.    Watch closely for changes in your health, and be sure to contact your doctor if:    · Your chest pain continues even though you are taking anti-inflammatory medicine.     · Your chest wall pain has not improved after 5 to 7 days. Where can you learn more? Go to http://hayder-tresaure.info/. Enter X106 in the search box to learn more about \"Costochondritis: Care Instructions. \"  Current as of: September 23, 2018  Content Version: 12.1  © 7639-7703 [employer]. Care instructions adapted under license by [employer] (which disclaims liability or warranty for this information). If you have questions about a medical condition or this instruction, always ask your healthcare professional. [place] [address] any warranty or liability for your use of this information. Neck Strain: Care Instructions  Your Care Instructions    You have strained the muscles and ligaments in your neck. A sudden, awkward movement can strain the neck. This often occurs with falls or car accidents or during certain sports. Everyday activities like working on a computer or sleeping can also cause neck strain if they force you to hold your neck in an awkward position for a long time. It is common for neck pain to get worse for a day or two after an injury, but it should start to feel better after that. You may have more pain and stiffness for several days before it gets better. This is expected. It may take a few weeks or longer for it to heal completely. Good home treatment can help you get better faster and avoid future neck problems.   Follow-up care is a key part of your treatment and safety. Be sure to make and go to all appointments, and call your doctor if you are having problems. It's also a good idea to know your test results and keep a list of the medicines you take. How can you care for yourself at home? · If you were given a neck brace (cervical collar) to limit neck motion, wear it as instructed for as many days as your doctor tells you to. Do not wear it longer than you were told to. Wearing a brace for too long can make neck stiffness worse and weaken the neck muscles. · You can try using heat or ice to see if it helps. ? Try using a heating pad on a low or medium setting for 15 to 20 minutes every 2 to 3 hours. Try a warm shower in place of one session with the heating pad. You can also buy single-use heat wraps that last up to 8 hours. ? You can also try an ice pack for 10 to 15 minutes every 2 to 3 hours. · Take pain medicines exactly as directed. ? If the doctor gave you a prescription medicine for pain, take it as prescribed. ? If you are not taking a prescription pain medicine, ask your doctor if you can take an over-the-counter medicine. · Gently rub the area to relieve pain and help with blood flow. Do not massage the area if it hurts to do so. · Do not do anything that makes the pain worse. Take it easy for a couple of days. You can do your usual activities if they do not hurt your neck or put it at risk for more stress or injury. · Try sleeping on a special neck pillow. Place it under your neck, not under your head. Placing a tightly rolled-up towel under your neck while you sleep will also work. If you use a neck pillow or rolled towel, do not use your regular pillow at the same time. · To prevent future neck pain, do exercises to stretch and strengthen your neck and back. Learn how to use good posture, safe lifting techniques, and proper body mechanics. When should you call for help?   Call 911 anytime you think you may need emergency care. For example, call if:    · You are unable to move an arm or a leg at all.   [facility] your doctor now or seek immediate medical care if:    · You have new or worse symptoms in your arms, legs, chest, belly, or buttocks. Symptoms may include:  ? Numbness or tingling. ? Weakness. ? Pain.     · You lose bladder or bowel control.    Watch closely for changes in your health, and be sure to contact your doctor if:    · You are not getting better as expected. Where can you learn more? Go to http://hayder-treasure.info/. Enter M253 in the search box to learn more about \"Neck Strain: Care Instructions. \"  Current as of: September 20, 2018  Content Version: 12.1  © 2431-2850 [employer]. Care instructions adapted under license by [facility] (which disclaims liability or warranty for this information). If you have questions about a medical condition or this instruction, always ask your healthcare professional. [place] [address] any warranty or liability for your use of this information.

## 2019-08-06 NOTE — PROGRESS NOTES
[address]  [phone]           Progress Note    Patient: Kelly Chambers MRN: <[id number]>  SSN: [id number]    YOB: 1980  Age: 34 y.o. Sex: male        Chief Complaint   Patient presents with    Neck Pain     Left Side -- Trouble lifting     Chest Pain     Patient reports that the pain lasted for 2 hours two weeks ago. Subjective:     Encounter Diagnoses   Name Primary?  Neck pain: Adan night he slept in a different bed at a relative's home. he is awake the next morning with left-sided neck pain he has no numbness no tingling and no loss of strength in the left arm. He has no history of cervical disc disease. On exam he has muscle spasm in his proximal trapezius. This is consistent with an acute muscular strain. Yes    Chest pain, unspecified type: 2 weeks ago he had a severe episode of sharp stabbing left anterior chest pain along the costal margin that made it impossible for him to lift anything with his left arm. He works as a  and could not lift the basket of chicken wings out of the  [place]. He had no shortness of breath, no left arm pain per se and no sweating.  Heart murmur: He says as a child he had an irregular heartbeat who was never told he had a murmur. On exam today he was found to have a 1/6 systolic murmur located in the left precordium. His EKG was unremarkable. I recommended that he have cardiac work-up for this particularly with his history of irregular heartbeat as a child. Current and past medical information:    Current Medications after this visit[de-identified]     Current Outpatient Medications   Medication Sig    erythromycin (ILOTYCIN) ophthalmic ointment 1cm ribbon applied up to 4 times daily for 10 days     No current facility-administered medications for this visit.         There are no active problems to display for this patient. History reviewed. No pertinent past medical history. No Known Allergies    History reviewed. No pertinent surgical history. Social History     Socioeconomic History    Marital status:      Spouse name: Not on file    Number of children: Not on file    Years of education: Not on file    Highest education level: Not on file   Tobacco Use    Smoking status: Current Every Day Smoker     Packs/day: 0.25     Years: 15.00     Pack years: 3.75    Smokeless tobacco: Never Used   Substance and Sexual Activity    Alcohol use: Yes     Comment: social    Drug use: No     Comment: use to smoke marijuana years ago    Sexual activity: Not Currently   Social History Narrative    ** Merged History Encounter **            ROS     Objective:     Vitals:    08/06/19 1025   BP: 106/70   Pulse: 97   Resp: 20   Temp: 98.3 °F (36.8 °C)   TempSrc: Oral   SpO2: 96%   Weight: 229 lb 6.4 oz (104.1 kg)   Height: 6' 2\" (1.88 m)      Body mass index is 29.45 kg/m². Physical Exam   Constitutional: He is oriented to person, place, and time and well-developed, well-nourished, and in no distress. HENT:   Head: Normocephalic and atraumatic. Mouth/Throat: Oropharynx is clear and moist.   Eyes: Right eye exhibits no discharge. Left eye exhibits no discharge. No scleral icterus. Neck: No thyromegaly present. No bruit. Cardiovascular: Normal rate and regular rhythm. Murmur heard. 1/6 CLARK left precordium. Louder when recumbent. Pulmonary/Chest: Effort normal and breath sounds normal.   Abdominal: Soft. Musculoskeletal: He exhibits tenderness. Back:    Tender swollen proximal trapezius. Pain is worse when he turns his head to the left. No radicular symptoms. Neurological: He is alert and oriented to person, place, and time. Skin: No rash noted. No erythema. Psychiatric: Mood and affect normal.   Nursing note and vitals reviewed.         Health Maintenance Due   Topic Date Due    Pneumococcal 0-64 years (1 of 1 - PPSV23) 09/16/1984    DTaP/Tdap/Td series (1 - Tdap) 09/16/1999    Influenza Age 9 to Adult  08/01/2019         Assessment and orders:     Encounter Diagnoses     ICD-10-CM ICD-9-CM   1. Neck pain M54.2 723.1   2. Chest pain, unspecified type R07.9 786.50   3. Heart murmur R01.1 785. 2     Diagnoses and all orders for this visit:    1. Neck pain/strain. Aleve and heating pad recommended. 2. Chest pain, unspecified type-felt to be noncardiac. If this recurs he will take Aleve and use a heating pad to the area. Changes in any way he will contact us. -     AMB POC EKG ROUTINE W/ 12 LEADS, INTER & REP    3. Heart murmur-with history of irregular heartbeat as a child I would feel more comfortable having this worked up. He has no family history of heart disease.  -     REFERRAL TO CARDIOLOGY      Plan of care:  Discussed diagnoses in detail with patient. Medication risks/benefits/side effects discussed with patient. All of the patient's questions were addressed. The patient understands and agrees with our plan of care. The patient knows to call back if they are unsure of or forget any changes we discussed today or if the symptoms change. The patient received an After-Visit Summary which contains VS, orders, medication list and allergy list. This can be used as a \"mini-medical record\" should they have to seek medical care while out of town. Patient Care Team:  Isac Harding MD as PCP - [facility])  None    Follow-up and Dispositions    · Return if symptoms worsen or fail to improve. No future appointments. Signed By: Yoshi Yu MD     August 6, 2019      ATTENTION:   This medical record was transcribed using an electronic medical records/speech recognition system. Although proofread, it may and can contain electronic, spelling and other errors. Corrections may be executed at a later time.   Please feel free to contact me for any clarifications as needed.

## 2019-08-06 NOTE — PROGRESS NOTES
Chief Complaint   Patient presents with    Neck Pain     Left Side -- Trouble lifting     Chest Pain     Patient reports that the pain lasted for 2 hours two weeks ago. Body mass index is 29.45 kg/m². 1. Have you been to the ER, urgent care clinic since your last visit? Hospitalized since your last visit? No    2. Have you seen or consulted any other health care providers outside of the [address] [place] [address] since your last visit? Include any pap smears or colon screening.  No    Reviewed record in preparation for visit and have necessary documentation  Pt did not bring medication to office visit for review  Information was given to pt on Advanced Directives, Living Will  Information was given on Shingles Vaccine  Opportunity was given for questions  Goals that were addressed and/or need to be completed after this appointment include:     Health Maintenance Due   Topic Date Due    Pneumococcal 0-64 years (1 of 1 - PPSV23) 09/16/1984    DTaP/Tdap/Td series (1 - Tdap) 09/16/1999    Influenza Age 5 to Adult  08/01/2019

## 2019-08-08 ENCOUNTER — OFFICE VISIT (OUTPATIENT)
Dept: FAMILY MEDICINE CLINIC | Age: 39
End: 2019-08-08

## 2019-08-08 ENCOUNTER — TELEPHONE (OUTPATIENT)
Dept: FAMILY MEDICINE CLINIC | Age: 39
End: 2019-08-08

## 2019-08-08 VITALS
TEMPERATURE: 98.6 F | OXYGEN SATURATION: 99 % | WEIGHT: 227.8 LBS | RESPIRATION RATE: 16 BRPM | HEIGHT: 74 IN | BODY MASS INDEX: 29.24 KG/M2 | DIASTOLIC BLOOD PRESSURE: 74 MMHG | HEART RATE: 81 BPM | SYSTOLIC BLOOD PRESSURE: 115 MMHG

## 2019-08-08 DIAGNOSIS — M54.2 NECK PAIN: Primary | ICD-10-CM

## 2019-08-08 RX ORDER — MELOXICAM 7.5 MG/1
7.5 TABLET ORAL DAILY
Qty: 21 TAB | Refills: 0 | Status: SHIPPED | OUTPATIENT
Start: 2019-08-08 | End: 2019-08-20

## 2019-08-08 RX ORDER — CYCLOBENZAPRINE HCL 10 MG
5 TABLET ORAL
Qty: 21 TAB | Refills: 0 | Status: SHIPPED | OUTPATIENT
Start: 2019-08-08 | End: 2019-09-26

## 2019-08-08 NOTE — TELEPHONE ENCOUNTER
----- Message from Angel Linda sent at 8/8/2019  4:06 PM EDT -----  Regarding: Dr. Melodie Sotomayor  Pt is returning a call. Best contact number [phone].

## 2019-08-08 NOTE — PROGRESS NOTES
Chief Complaint   Patient presents with    Neck Pain     Worse since his last visit     Shoulder Pain     Left      Body mass index is 29.25 kg/m². 1. Have you been to the ER, urgent care clinic since your last visit? Hospitalized since your last visit? No    2. Have you seen or consulted any other health care providers outside of the [address] [place] [address] since your last visit? Include any pap smears or colon screening. No    Reviewed record in preparation for visit and have necessary documentation  Pt did not bring medication to office visit for review  Information was given to pt on Advanced Directives, Living Will  Information was given on Shingles Vaccine  Opportunity was given for questions  Goals that were addressed and/or need to be completed after this appointment include:     Health Maintenance Due   Topic Date Due    Pneumococcal 0-64 years (1 of 1 - PPSV23) 09/16/1984    DTaP/Tdap/Td series (1 - Tdap) 09/16/1999    Influenza Age 5 to Adult  08/01/2019

## 2019-08-08 NOTE — PROGRESS NOTES
I discussed the findings, assessment and plan in detail with the resident and agree with the resident's findings and plan as documented in the resident's note. Debi Yu M.D.

## 2019-08-08 NOTE — PROGRESS NOTES
[facility]    Subjective:   Ivan Dalton is a 34 y.o. male with no PMHx  CC: L shoulder pain  History provided by patient     HPI:  He saw Dr. Bobby Yu on Tuesday. He slept wrong and the pain has gotten worse. His neck muscle is spasming on the left side. He went to visit his sisters house and slept in a very small bed. He felt the pain immediately after sleeping. He states that it has been affecting his work. It is very hard for him to lift his L arm because of the pain. It is a dull pain that radiates into his left shoulder. PFSH:     No current outpatient medications on file prior to visit. No current facility-administered medications on file prior to visit. There is no problem list on file for this patient.       Social History     Socioeconomic History    Marital status:      Spouse name: Not on file    Number of children: Not on file    Years of education: Not on file    Highest education level: Not on file   Occupational History    Not on file   Social Needs    Financial resource strain: Not on file    Food insecurity:     Worry: Not on file     Inability: Not on file    Transportation needs:     Medical: Not on file     Non-medical: Not on file   Tobacco Use    Smoking status: Current Every Day Smoker     Packs/day: 0.25     Years: 15.00     Pack years: 3.75    Smokeless tobacco: Never Used   Substance and Sexual Activity    Alcohol use: Yes     Comment: social    Drug use: No     Comment: use to smoke marijuana years ago    Sexual activity: Not Currently   Lifestyle    Physical activity:     Days per week: Not on file     Minutes per session: Not on file    Stress: Not on file   Relationships    Social connections:     Talks on phone: Not on file     Gets together: Not on file     Attends Yazidism service: Not on file     Active member of club or organization: Not on file     Attends meetings of clubs or organizations: Not on file     Relationship status: Not on file    Intimate partner violence:     Fear of current or ex partner: Not on file     Emotionally abused: Not on file     Physically abused: Not on file     Forced sexual activity: Not on file   Other Topics Concern    Not on file   Social History Narrative    ** Merged History Encounter **            Review of Systems   Constitutional: Negative for chills and fever. Musculoskeletal: Positive for myalgias and neck pain. Negative for back pain and joint pain. Neurological: Negative for dizziness, tingling, sensory change, weakness and headaches. Objective:     Visit Vitals  /74 (BP 1 Location: Right arm, BP Patient Position: Sitting)   Pulse 81   Temp 98.6 °F (37 °C) (Oral)   Resp 16   Ht 6' 2\" (1.88 m)   Wt 227 lb 12.8 oz (103.3 kg)   SpO2 99%   BMI 29.25 kg/m²          Physical Exam   Constitutional: He appears well-developed and well-nourished. No distress. HENT:   Head: Normocephalic and atraumatic. Neck: Neck supple. No thyromegaly present. Limited rotation and flexion of the cervical spine to the left due to pain. Full ROM of the left shoulder. Negative crossover, drop arm, neers/celeste. Tenderness to palpation of proximal trapezius muscle of the left side. Muscle spasm on exam.   Cardiovascular: Normal rate, regular rhythm, normal heart sounds and intact distal pulses. No murmur heard. Pulmonary/Chest: Effort normal and breath sounds normal. No respiratory distress. He has no wheezes. He has no rales. Musculoskeletal: He exhibits tenderness. He exhibits no edema or deformity. Lymphadenopathy:     He has no cervical adenopathy. Skin: Skin is warm and dry. He is not diaphoretic. No erythema. Pertinent Labs/Studies: none      Assessment and orders:       ICD-10-CM ICD-9-CM    1. Neck pain M54.2 723.1      Encounter Diagnoses   Name Primary?  Neck pain Yes     Diagnoses and all orders for this visit:    1.  Neck pain - Pt continues to have muscle spasm in left proximal trapezius. Slept on it the wrong way. Hurts to left left arm and turn head to the left. Tense on exam. Will try muscle relaxant and change aleve to mobic. -     cyclobenzaprine (FLEXERIL) 10 mg tablet; Take 0.5 Tabs by mouth three (3) times daily as needed for Muscle Spasm(s). -     meloxicam (MOBIC) 7.5 mg tablet; Take 1 Tab by mouth daily. Follow-up and Dispositions    · Return if symptoms worsen or fail to improve. I have discussed the diagnosis with the patient and the intended plan as seen in the above orders. Social history, medical history, and labs were reviewed. The patient has received an after-visit summary and questions were answered concerning future plans. I have discussed medication side effects and warnings with the patient as well.     Constantine Edwards MD  Resident [facility]  08/11/19

## 2019-08-08 NOTE — PATIENT INSTRUCTIONS
Neck Spasm: Care Instructions  Your Care Instructions  A neck spasm is sudden tightness and pain in your neck muscles. A spasm may be caused by some activities or repeated movements. For example, you may be more likely to have a neck spasm if you slouch, paint a ceiling, work at a computer, or sleep with your neck twisted. But the cause isn't always clear. Home treatment includes using heat or ice, taking over-the-counter (OTC) pain medicines, and avoiding activities that may lead to neck pain. Gentle stretching, or treatments such as massage or manipulation, may also help ease a neck spasm. For a neck spasm that doesn't get better with home care, your doctor may prescribe medicine. He or she may also suggest exercise or physical therapy to help strengthen or relax your neck muscles. Follow-up care is a key part of your treatment and safety. Be sure to make and go to all appointments, and call your doctor if you are having problems. It's also a good idea to know your test results and keep a list of the medicines you take. How can you care for yourself at home? · To relieve pain, use heat or ice (whichever feels better) on the affected area. ? Put a warm water bottle, a heating pad set on low, or a warm cloth on your neck. Put a thin cloth between the heating pad and your skin. Do not go to sleep with a heating pad on your skin. ? Try ice or a cold pack on the area for 10 to 20 minutes at a time. Put a thin cloth between the ice and your skin. · Ask your doctor if you can take acetaminophen (such as Tylenol) or nonsteroidal anti-inflammatory drugs, such as ibuprofen or naproxen. Your doctor can prescribe stronger medicines if needed. Be safe with medicines. Read and follow all instructions on the label. · Stretch your muscles every day, especially before and after exercise and at bedtime. Regular stretching can help relax your muscles.   · Try to find a pillow and a position in bed that help improve your night's rest.  · Try to stay active. It's best to start activity slowly. If an exercise makes your pain worse, stop doing it. When should you call for help? Call 911 anytime you think you may need emergency care. For example, call if:    · You are unable to move an arm or a leg at all.   [facility] your doctor now or seek immediate medical care if:    · You have new or worse symptoms in your arms, legs, belly, or buttocks. Symptoms may include:  ? Numbness or tingling. ? Weakness. ? Pain.     · You lose bladder or bowel control.    Watch closely for changes in your health, and be sure to contact your doctor if:    · You do not get better as expected. Where can you learn more? Go to http://hayder-treasure.info/. Enter D414 in the search box to learn more about \"Neck Spasm: Care Instructions. \"  Current as of: September 20, 2018  Content Version: 12.1  © 3657-3964 Healthwise, Inc[employer]. Care instructions adapted under license by [employer] (which disclaims liability or warranty for this information). If you have questions about a medical condition or this instruction, always ask your healthcare professional. [address] any warranty or liability for your use of this information.

## 2019-08-20 ENCOUNTER — OFFICE VISIT (OUTPATIENT)
Dept: FAMILY MEDICINE CLINIC | Age: 39
End: 2019-08-20

## 2019-08-20 VITALS
HEART RATE: 101 BPM | SYSTOLIC BLOOD PRESSURE: 109 MMHG | OXYGEN SATURATION: 99 % | TEMPERATURE: 98 F | DIASTOLIC BLOOD PRESSURE: 74 MMHG | WEIGHT: 227 LBS | RESPIRATION RATE: 20 BRPM | HEIGHT: 74 IN | BODY MASS INDEX: 29.13 KG/M2

## 2019-08-20 DIAGNOSIS — M54.2 NECK PAIN: Primary | ICD-10-CM

## 2019-08-20 DIAGNOSIS — M25.512 ACUTE PAIN OF LEFT SHOULDER: ICD-10-CM

## 2019-08-20 RX ORDER — METHOCARBAMOL 750 MG/1
750 TABLET, FILM COATED ORAL 3 TIMES DAILY
Qty: 30 TAB | Refills: 0 | Status: SHIPPED | OUTPATIENT
Start: 2019-08-20 | End: 2019-09-26

## 2019-08-20 RX ORDER — PREDNISONE 20 MG/1
60 TABLET ORAL DAILY
Qty: 15 TAB | Refills: 0 | Status: SHIPPED | OUTPATIENT
Start: 2019-08-20 | End: 2019-09-26 | Stop reason: ALTCHOICE

## 2019-08-20 NOTE — PROGRESS NOTES
Chief Complaint   Patient presents with    Neck Pain     with shoulder pain     Visit Vitals  /74 (BP 1 Location: Right arm, BP Patient Position: Sitting)   Pulse (!) 101   Temp 98 °F (36.7 °C) (Oral)   Resp 20   Ht 6' 2\" (1.88 m)   Wt 227 lb (103 kg)   SpO2 99%   BMI 29.15 kg/m²     1. Have you been to the ER, urgent care clinic since your last visit? Hospitalized since your last visit? No    2. Have you seen or consulted any other health care providers outside of the [address] [place] [address] since your last visit? Include any pap smears or colon screening.  No    Reviewed record in preparation for visit and have necessary documentation  Pt did not bring medication to office visit for review  opportunity was given for questions  Goals that were addressed and/or need to be completed during or after this appointment include   Health Maintenance Due   Topic Date Due    Pneumococcal 0-64 years (1 of 1 - PPSV23) 09/16/1984    DTaP/Tdap/Td series (1 - Tdap) 09/16/1999    Influenza Age 5 to Adult  08/01/2019

## 2019-08-26 NOTE — PATIENT INSTRUCTIONS
Musculoskeletal Pain: Care Instructions  Your Care Instructions    Different problems with the bones, muscles, nerves, ligaments, and tendons in the body can cause pain. One or more areas of your body may ache or burn. Or they may feel tired, stiff, or sore. The medical term for this type of pain is musculoskeletal pain. It can have many different causes. Sometimes the pain is caused by an injury such as a strain or sprain. Or you might have pain from using one part of your body in the same way over and over again. This is called overuse. In some cases, the cause of the pain is another health problem such as arthritis or fibromyalgia. The doctor will examine you and ask you questions about your health to help find the cause of your pain. Blood tests or imaging tests like an X-ray may also be helpful. But sometimes doctors can't find a cause of the pain. Treatment depends on your symptoms and the cause of the pain, if known. The doctor has checked you carefully, but problems can develop later. If you notice any problems or new symptoms, get medical treatment right away. Follow-up care is a key part of your treatment and safety. Be sure to make and go to all appointments, and call your doctor if you are having problems. It's also a good idea to know your test results and keep a list of the medicines you take. How can you care for yourself at home? · Rest until you feel better. · Do not do anything that makes the pain worse. Return to exercise gradually if you feel better and your doctor says it's okay. · Be safe with medicines. Read and follow all instructions on the label. ? If the doctor gave you a prescription medicine for pain, take it as prescribed. ? If you are not taking a prescription pain medicine, ask your doctor if you can take an over-the-counter medicine. · Put ice or a cold pack on the area for 10 to 20 minutes at a time to ease pain.  Put a thin cloth between the ice and your skin.  When should you call for help? Call your doctor now or seek immediate medical care if:    · You have new pain, or your pain gets worse.     · You have new symptoms such as a fever, a rash, or chills.    Watch closely for changes in your health, and be sure to contact your doctor if:    · You do not get better as expected. Where can you learn more? Go to http://hayder-treasure.info/. Enter V654 in the search box to learn more about \"Musculoskeletal Pain: Care Instructions. \"  Current as of: March 28, 2019  Content Version: 12.1  © 4660-1623 Healthwise, [employer]. Care instructions adapted under license by [employer] (which disclaims liability or warranty for this information). If you have questions about a medical condition or this instruction, always ask your healthcare professional. [address] any warranty or liability for your use of this information.

## 2019-08-26 NOTE — PROGRESS NOTES
Patient: Steve Gonzalez MRN: <[id number]>  SSN: [id number]    YOB: 1980  Age: 34 y.o. Sex: male      Chief Complaint   Patient presents with    Neck Pain     with shoulder pain     Steve Gonzalez is a 34 y.o. male who complains of shoulder pain of on the left  for 2  week(s), The pain is positional with movement, without radiation . Pain is improved with rest. Severity of pain is 10 out of 10.  numbness, tingling, weakness is not present  Precipitating factors: awoke with pain. Says he has been unable to return to work. There is no problem list on file for this patient. History reviewed. No pertinent surgical history.   Social History     Socioeconomic History    Marital status:      Spouse name: Not on file    Number of children: Not on file    Years of education: Not on file    Highest education level: Not on file   Occupational History    Not on file   Social Needs    Financial resource strain: Not on file    Food insecurity:     Worry: Not on file     Inability: Not on file    Transportation needs:     Medical: Not on file     Non-medical: Not on file   Tobacco Use    Smoking status: Current Every Day Smoker     Packs/day: 0.25     Years: 15.00     Pack years: 3.75    Smokeless tobacco: Never Used   Substance and Sexual Activity    Alcohol use: Yes     Comment: social    Drug use: No     Comment: use to smoke marijuana years ago    Sexual activity: Not Currently   Lifestyle    Physical activity:     Days per week: Not on file     Minutes per session: Not on file    Stress: Not on file   Relationships    Social connections:     Talks on phone: Not on file     Gets together: Not on file     Attends Muslim service: Not on file     Active member of club or organization: Not on file     Attends meetings of clubs or organizations: Not on file     Relationship status: Not on file    Intimate partner violence:     Fear of current or ex partner: Not on file Emotionally abused: Not on file     Physically abused: Not on file     Forced sexual activity: Not on file   Other Topics Concern    Not on file   Social History Narrative    ** Merged History Encounter **          Family History   Problem Relation Age of Onset    Cancer Mother      Current Outpatient Medications   Medication Sig    predniSONE (DELTASONE) 20 mg tablet Take 60 mg by mouth daily.  methocarbamol (ROBAXIN) 750 mg tablet Take 1 Tab by mouth three (3) times daily.  cyclobenzaprine (FLEXERIL) 10 mg tablet Take 0.5 Tabs by mouth three (3) times daily as needed for Muscle Spasm(s). No current facility-administered medications for this visit. No Known Allergies    Review of Symptoms:  Constitutional: Negative for fever, chills, fatigue, malaise  Skin: Negative for rash or lesion  CV: Negative for chest pain or palpitations  Resp: Negative for cough, wheezing or SOB  Gastrointestinal: Negative for nausea or abdominal pain  Musculoskeletal: see HPI  Neurological: Negative for weakness or paresthesia         Vitals:    08/20/19 1411   BP: 109/74   Pulse: (!) 101   Resp: 20   Temp: 98 °F (36.7 °C)   TempSrc: Oral   SpO2: 99%   Weight: 227 lb (103 kg)   Height: 6' 2\" (1.88 m)        Physical Examination:  General: Well developed, well nourished, in no acute distress  Skin: Warm and dry, no rash or lesion appreciated  Head: Normocephalic, atraumatic  Eyes: Sclera clear, EOMI  Neck: Normal range of motion, left posterior TTP  Respiratory: symmetrical, unlabored effort  Cardiovascular:  Regular rate and rhythm  Extremities: left posterior shoulder TTP, FROM  Neurological: Normal strength and sensation. No focal deficits  Psychological: Active, alert and oriented. Affect appropriate        Diagnoses and all orders for this visit:    1. Neck pain  -     predniSONE (DELTASONE) 20 mg tablet; Take 60 mg by mouth daily. -     methocarbamol (ROBAXIN) 750 mg tablet;  Take 1 Tab by mouth three (3) times daily.  -     REFERRAL TO PHYSICAL THERAPY    2. Acute pain of left shoulder  -     predniSONE (DELTASONE) 20 mg tablet; Take 60 mg by mouth daily. -     methocarbamol (ROBAXIN) 750 mg tablet; Take 1 Tab by mouth three (3) times daily.  -     REFERRAL TO PHYSICAL THERAPY         PLAN:  rest the injured area as much as practical, apply heat (warned not to sleep on heating pad)  Discussed expected course, resolution and possible complications of diagnosis in detail with patient. Goals of treatment  were discussed. All of the patient's questions were addressed. The patient expresses understanding and agreement with our plan of care. The patient has received an after-visit summary and questions were answered concerning future plans. I have discussed medication side effects and warnings with the patient as well.

## 2019-08-29 ENCOUNTER — TELEPHONE (OUTPATIENT)
Dept: FAMILY MEDICINE CLINIC | Age: 39
End: 2019-08-29

## 2019-08-29 DIAGNOSIS — M25.512 ACUTE PAIN OF LEFT SHOULDER: Primary | ICD-10-CM

## 2019-08-29 RX ORDER — GABAPENTIN 100 MG/1
100 CAPSULE ORAL 3 TIMES DAILY
Qty: 90 CAP | Refills: 0 | Status: SHIPPED | OUTPATIENT
Start: 2019-08-29 | End: 2020-12-23

## 2019-08-29 RX ORDER — NABUMETONE 500 MG/1
500 TABLET, FILM COATED ORAL 2 TIMES DAILY
Qty: 60 TAB | Refills: 1 | Status: SHIPPED | OUTPATIENT
Start: 2019-08-29 | End: 2019-09-26

## 2019-08-29 NOTE — TELEPHONE ENCOUNTER
Patient still having neck/shoulder pain that is not improving. Patient is going to PT today, but is requesting a refill on steroid or something different for pain. I advised him to ask PT to let us know if he should need any imaging. Patient expressed understanding.

## 2019-09-26 ENCOUNTER — OFFICE VISIT (OUTPATIENT)
Dept: FAMILY MEDICINE CLINIC | Age: 39
End: 2019-09-26

## 2019-09-26 VITALS
TEMPERATURE: 99.5 F | RESPIRATION RATE: 16 BRPM | BODY MASS INDEX: 28.23 KG/M2 | OXYGEN SATURATION: 96 % | WEIGHT: 220 LBS | SYSTOLIC BLOOD PRESSURE: 120 MMHG | DIASTOLIC BLOOD PRESSURE: 77 MMHG | HEART RATE: 97 BPM | HEIGHT: 74 IN

## 2019-09-26 DIAGNOSIS — J06.9 VIRAL URI: Primary | ICD-10-CM

## 2019-09-26 DIAGNOSIS — Z23 ENCOUNTER FOR IMMUNIZATION: ICD-10-CM

## 2019-09-26 RX ORDER — FLUTICASONE PROPIONATE 50 MCG
SPRAY, SUSPENSION (ML) NASAL
Qty: 1 BOTTLE | Refills: 0 | Status: SHIPPED | OUTPATIENT
Start: 2019-09-26 | End: 2020-10-30

## 2019-09-26 RX ORDER — MELOXICAM 7.5 MG/1
TABLET ORAL
Refills: 0 | COMMUNITY
Start: 2019-08-08 | End: 2020-09-11 | Stop reason: DRUGHIGH

## 2019-09-26 NOTE — PROGRESS NOTES
I discussed the findings, assessment and plan in detail with the resident and agree with the resident's findings and plan as documented in the resident's note. Alireza Mills M.D.

## 2019-09-26 NOTE — PROGRESS NOTES
Subjective  CC: Kelly Chambers is an 37 y.o. male presents for cough    Body aches  He states that he is having nasal congestion that started yesterday. He took Night-quil last night that helped with the body aches but continues to have the congestion and fatigue. Sick contacts include children. He took his temperature yesterday with fever of 100.5. He endorses chills. He states he has CP with cough and some SOB. Allergies - reviewed:   No Known Allergies      Medications - reviewed:   Current Outpatient Medications   Medication Sig    meloxicam (MOBIC) 7.5 mg tablet TAKE 1 TABLET BY MOUTH ONCE DAILY    gabapentin (NEURONTIN) 100 mg capsule Take 1 Cap by mouth three (3) times daily. Max Daily Amount: 300 mg.    nabumetone (RELAFEN) 500 mg tablet Take 1 Tab by mouth two (2) times a day.  predniSONE (DELTASONE) 20 mg tablet Take 60 mg by mouth daily.  methocarbamol (ROBAXIN) 750 mg tablet Take 1 Tab by mouth three (3) times daily.  cyclobenzaprine (FLEXERIL) 10 mg tablet Take 0.5 Tabs by mouth three (3) times daily as needed for Muscle Spasm(s). No current facility-administered medications for this visit. Past Medical History - reviewed:  History reviewed. No pertinent past medical history. Immunizations - reviewed: There is no immunization history on file for this patient. ROS  Review of Systems : A review of systems was performed. All pertinent negatives and positives are mentioned in the HPI. Physical Exam  Visit Vitals  /77 (BP 1 Location: Right arm, BP Patient Position: Sitting)   Pulse 97   Temp 99.5 °F (37.5 °C) (Oral)   Resp 16   Ht 6' 2\" (1.88 m)   Wt 220 lb (99.8 kg)   SpO2 96%   BMI 28.25 kg/m²       General appearance - Alert, NAD. Head: Atraumatic. Normocephalic. No lymphadenopathy  Eyes: EOMI. Sclera white. Ears: Hearing grossly normal. TM non erythematous with no effusion   Nose: Nares patent, no polyps.  Scant mucous present  Neck: No cervical lymphadenopathy or goiter pesent  Throat: pharynx clear, no exudates. Respiratory - LCTAB. No wheeze/rale/rhonchi  Heart - Normal rate, regular rhythm. No m/r/r  Extremities - No LE edema. Distal pulses intact  Skin - normal coloration and normal turgor. No cyanosis, no rash. Assessment/Plan  1. Viral URI  - fluticasone propionate (FLONASE) 50 mcg/actuation nasal spray; Use 2 sprays in each nostril daily. Dispense: 1 Bottle; Refill: 0    2. Encounter for immunization - discussed importance of pneumococcal vaccine in smokers to prevent complications from PNA  - pneumococcal 23-shaye ps vaccine (PNEUMOVAX 23) 25 mcg/0.5 mL injection; 0.5 mL by IntraMUSCular route once for 1 dose. Dispense: 0.5 mL; Refill: 0            I have discussed the aforementioned diagnoses and plan with the patient in detail. I have provided information in person and/or in AVS. All questions answered prior to discharge.     Micah Anton MD  Family Medicine Resident

## 2019-09-26 NOTE — PATIENT INSTRUCTIONS
Try a sinus rinse kit (for example parris pot) to help move secretions from your sinus.     Start using Flonase 2 spray in each nostril once a day for 2 week. Use Afrin spray one spray in each nostril daily. Do NOT exceed 3 days of use. Use first, followed by one spray of nasal saline followed by one spay of the Flonase. Stay well hydrated as symptoms can last up to 1.5 weeks      Use tylenol/motrin as needed for generalized muscle pain and fever.     Sudafed for congestion or Afrin spray one spray in each nostril daily. Do NOT exceed 3 days of use.     Try Tea with honey for cough or throat irritation.      Salt water rinses or Cepacol drops for throat irritation     Wash hand frequently and cough/sneeze into your sleeve to help prevent infection of others          Saline Nasal Washes: Care Instructions  Your Care Instructions  Saline nasal washes help keep the nasal passages open by washing out thick or dried mucus. This simple remedy can help relieve symptoms of allergies, sinusitis, and colds. It also can make the nose feel more comfortable by keeping the mucous membranes moist. You may notice a little burning sensation in your nose the first few times you use the solution, but this usually gets better in a few days. Follow-up care is a key part of your treatment and safety. Be sure to make and go to all appointments, and call your doctor if you are having problems. It's also a good idea to know your test results and keep a list of the medicines you take. How can you care for yourself at home? · You can buy premixed saline solution in a squeeze bottle or other sinus rinse products at a drugstore. Read and follow the instructions on the label. · You also can make your own saline solution by adding 1 teaspoon of salt and 1 teaspoon of baking soda to 2 cups of distilled water. · If you use a homemade solution, pour a small amount into a clean bowl.  Using a rubber bulb syringe, squeeze the syringe and place the tip in the salt water. Pull a small amount of the salt water into the syringe by relaxing your hand. · Sit down with your head tilted slightly back. Do not lie down. Put the tip of the bulb syringe or the squeeze bottle a little way into one of your nostrils. Gently drip or squirt a few drops into the nostril. Repeat with the other nostril. Some sneezing and gagging are normal at first.  · Gently blow your nose. · Wipe the syringe or bottle tip clean after each use. · Repeat this 2 or 3 times a day. · Use nasal washes gently if you have nosebleeds often. When should you call for help? Watch closely for changes in your health, and be sure to contact your doctor if:    · You often get nosebleeds.     · You have problems doing the nasal washes. Where can you learn more? Go to http://hayder-treasure.info/. Enter [id number] [phone] in the search box to learn more about \"Saline Nasal Washes: Care Instructions. \"  Current as of: March 28, 2018  Content Version: 11.8  © 0382-6308 [employer] Care instructions adapted under license by [employer] (which disclaims liability or warranty for this information). If you have questions about a medical condition or this instruction, always ask your healthcare professional. [place] [address] any warranty or liability for your use of this information.

## 2019-09-26 NOTE — PROGRESS NOTES
1. Have you been to the ER, urgent care clinic since your last visit? Hospitalized since your last visit? No    2. Have you seen or consulted any other health care providers outside of the [address] [place] [address] since your last visit? Include any pap smears or colon screening.  No  Reviewed record in preparation for visit and have necessary documentation  Pt did not bring medication to office visit for review    Goals that were addressed and/or need to be completed during or after this appointment include   Health Maintenance Due   Topic Date Due    Pneumococcal 0-64 years (1 of 1 - PPSV23) 09/16/1984    DTaP/Tdap/Td series (1 - Tdap) 09/16/1999    Influenza Age 5 to Adult  08/01/2019

## 2019-09-26 NOTE — LETTER
NOTIFICATION RETURN TO WORK / SCHOOL 
 
9/26/2019 4:03 PM 
 
Mr. Marco Mondragon [address]. To Whom It May Concern: 
 
Marco Mondragon is currently under the care of [facility]. He will return to work/school on: 9/26/19. Please excuse his absence on 9/25 and 9/26 If there are questions or concerns please have the patient contact our office. Sincerely, Teresa Ramsey MD

## 2020-02-12 ENCOUNTER — TELEPHONE (OUTPATIENT)
Dept: FAMILY MEDICINE CLINIC | Age: 40
End: 2020-02-12

## 2020-02-12 NOTE — TELEPHONE ENCOUNTER
----- Message from [employer] sent at 2/11/2020  5:12 PM EST -----  Regarding: Dr. Devante Garcia first and last name: Madan Peña  Reason for call:  returning call from office  Callback required yes/no and why: yes  Best contact number(s): [phone]  Details to clarify the request:

## 2020-08-14 ENCOUNTER — VIRTUAL VISIT (OUTPATIENT)
Dept: FAMILY MEDICINE CLINIC | Age: 40
End: 2020-08-14
Payer: MEDICAID

## 2020-08-14 DIAGNOSIS — M54.50 ACUTE RIGHT-SIDED LOW BACK PAIN WITHOUT SCIATICA: Primary | ICD-10-CM

## 2020-08-14 PROCEDURE — 99441 PR PHYS/QHP TELEPHONE EVALUATION 5-10 MIN: CPT | Performed by: FAMILY MEDICINE

## 2020-08-14 RX ORDER — PREDNISONE 20 MG/1
60 TABLET ORAL DAILY
Qty: 15 TAB | Refills: 0 | Status: SHIPPED | OUTPATIENT
Start: 2020-08-14 | End: 2020-09-11 | Stop reason: ALTCHOICE

## 2020-08-14 RX ORDER — CYCLOBENZAPRINE HCL 10 MG
10 TABLET ORAL
Qty: 30 TAB | Refills: 1 | Status: SHIPPED | OUTPATIENT
Start: 2020-08-14 | End: 2020-09-11 | Stop reason: SDUPTHER

## 2020-08-14 NOTE — PROGRESS NOTES
1. Have you been to the ER, urgent care clinic since your last visit? Hospitalized since your last visit? No    2. Have you seen or consulted any other health care providers outside of the [address] [place] [address] since your last visit? Include any pap smears or colon screening.  No  Reviewed record in preparation for visit and have necessary documentation  opportunity was given for questions  Goals that were addressed and/or need to be completed during or after this appointment include    Health Maintenance Due   Topic Date Due    Pneumococcal 0-64 years (1 of 1 - PPSV23) 09/16/1984    DTaP/Tdap/Td series (1 - Tdap) 09/16/1999    Lipid Screen  09/16/2018    Influenza Age 9 to Adult  08/01/2020

## 2020-08-14 NOTE — LETTER
NOTIFICATION RETURN TO WORK  
 
8/14/2020 11:43 AM 
 
Mr. Robert Matias [address]. To Whom It May Concern: 
 
Robert Matias is currently under the care of [facility]. He will return to work in 2-3 days with resolution of syptoms. If there are questions or concerns please have the patient contact our office. Sincerely, Milton Zeng MD

## 2020-08-24 NOTE — PROGRESS NOTES
Gwen Mcnally is a 37 y.o. male evaluated via telephone on 20. Patient Identity confirmed by . Telephone encounter done in lieu of office visit due to extraordinary circumstances. A state of national and state emergency has been declared by the President and the [place] due to the [employer] pandemic. Pursuant to the emergency declaration under the [facility] [address] authority and the [employer] and [employer] Act, this Virtual  Visit was conducted, with patient's consent, to reduce the patient's risk of exposure to COVID-19 and provide continuity of care for an established patient. Mona Villanueva LPN coordinated virtual visit    Consent:  Patient and/or health care decision maker is aware that he/she may receive a bill for this telephone encounter, depending on his insurance coverage, and has provided verbal consent to proceed: Yes    Physician Location: Office  Patient Location: Home    CC: LBP  Information gathered from patient and/or health care decision maker. HPI: Gwen Mcnally is a 37 y.o. male who was evaluated by synchronous (real-time) audio technology from his/her home. He complains of LBP  for several days The pain is positional with movement, without radiation . Pain is improved with rest. Numbness, tingling, weakness is not present  . Current Outpatient Medications:     predniSONE (DELTASONE) 20 mg tablet, Take 60 mg by mouth daily. , Disp: 15 Tab, Rfl: 0    cyclobenzaprine (FLEXERIL) 10 mg tablet, Take 1 Tab by mouth three (3) times daily as needed for Muscle Spasm(s). , Disp: 30 Tab, Rfl: 1    meloxicam (MOBIC) 7.5 mg tablet, TAKE 1 TABLET BY MOUTH ONCE DAILY, Disp: , Rfl: 0    fluticasone propionate (FLONASE) 50 mcg/actuation nasal spray, Use 2 sprays in each nostril daily. , Disp: 1 Bottle, Rfl: 0    gabapentin (NEURONTIN) 100 mg capsule, Take 1 Cap by mouth three (3) times daily. Max Daily Amount: 300 mg., Disp: 90 Cap, Rfl: 0     No Known Allergies     There are no active problems to display for this patient. Review of Systems:  Constitutional: Negative for fatigue, malaise  Resp: Negative for cough, wheezing or SOB  CV: Negative for chest pain, dizziness or palpitations  GI: Negative for nausea or abdominal pain  MS: see HPI  Neuro: Negative for HA, weakness or paresthesia  Psych: Negative for depression or anxiety       Assessment/Plan:  Details of this discussion including any medical advice provided:       ICD-10-CM ICD-9-CM    1. Acute right-sided low back pain without sciatica  M54.5 724.2 predniSONE (DELTASONE) 20 mg tablet      cyclobenzaprine (FLEXERIL) 10 mg tablet       Symptomatic therapy suggested: call prn if symptoms persist or worsen. Total Time: minutes: 5-10 minutes was spent on phone discussing above problems and plan. Patient medical history, prior OV notes, vitals flow sheet, lab results, medications, and allergies were reviewed during this encounter. For phone encounters:  I affirm this is a patient initiated episode with an established Patient who has not had a related appointment within my department in the past 7 days or scheduled within the next 24 hours.     Note: not billable if this call serves to triage the patient into an appointment for the relevant concern    MD VANDA Wells & GHULAM MARRERO [facility]  08/24/20

## 2020-09-08 ENCOUNTER — VIRTUAL VISIT (OUTPATIENT)
Dept: FAMILY MEDICINE CLINIC | Age: 40
End: 2020-09-08

## 2020-09-11 ENCOUNTER — VIRTUAL VISIT (OUTPATIENT)
Dept: FAMILY MEDICINE CLINIC | Age: 40
End: 2020-09-11
Payer: MEDICAID

## 2020-09-11 DIAGNOSIS — M54.2 NECK PAIN: ICD-10-CM

## 2020-09-11 DIAGNOSIS — M25.512 ACUTE PAIN OF LEFT SHOULDER: ICD-10-CM

## 2020-09-11 DIAGNOSIS — M54.50 ACUTE RIGHT-SIDED LOW BACK PAIN WITHOUT SCIATICA: Primary | ICD-10-CM

## 2020-09-11 PROCEDURE — 99442 PR PHYS/QHP TELEPHONE EVALUATION 11-20 MIN: CPT | Performed by: FAMILY MEDICINE

## 2020-09-11 RX ORDER — PREDNISONE 20 MG/1
60 TABLET ORAL DAILY
Qty: 15 TAB | Refills: 0 | Status: SHIPPED | OUTPATIENT
Start: 2020-09-11 | End: 2020-11-20

## 2020-09-11 RX ORDER — MELOXICAM 15 MG/1
15 TABLET ORAL DAILY
Qty: 30 TAB | Refills: 3 | Status: SHIPPED | OUTPATIENT
Start: 2020-09-11 | End: 2020-12-23

## 2020-09-11 RX ORDER — CYCLOBENZAPRINE HCL 10 MG
10 TABLET ORAL
Qty: 90 TAB | Refills: 0 | Status: SHIPPED | OUTPATIENT
Start: 2020-09-11 | End: 2020-12-23

## 2020-09-11 NOTE — PROGRESS NOTES
Tyrone Duran is a 41 y.o. male evaluated via telephone on 20. Patient Identity confirmed by . Telephone encounter done in lieu of office visit due to extraordinary circumstances. A state of national and state emergency has been declared by the President and the [place] due to the [employer] pandemic. Pursuant to the emergency declaration under the [facility] [address] authority and the [facility] and [employer] Act, this Virtual  Visit was conducted, with patient's consent, to reduce the patient's risk of exposure to COVID-19 and provide continuity of care for an established patient. Fannie Delacruz LPN coordinated virtual visit    Consent:  Patient and/or health care decision maker is aware that he/she may receive a bill for this telephone encounter, depending on his insurance coverage, and has provided verbal consent to proceed: Yes    Physician Location: Office  Patient Location: Home    CC: back spasms  Information gathered from patient and/or health care decision maker. HPI: Tyrone Duran is a 41 y.o. male who was evaluated by synchronous (real-time) audio technology from his/her home. He complains of multiple arthralgias and muscle spasms. Joint pain is positional with movement, without radiation . Pain is improved with rest. Numbness, tingling, weakness is not present  . Encounter Diagnoses   Name Primary?  Acute right-sided low back pain without sciatica Yes    Neck pain     Acute pain of left shoulder          Current Outpatient Medications:     cyclobenzaprine (FLEXERIL) 10 mg tablet, Take 1 Tab by mouth three (3) times daily as needed for Muscle Spasm(s). , Disp: 90 Tab, Rfl: 0    predniSONE (DELTASONE) 20 mg tablet, Take 60 mg by mouth daily. , Disp: 15 Tab, Rfl: 0    meloxicam (MOBIC) 15 mg tablet, Take 1 Tab by mouth daily. , Disp: 30 Tab, Rfl: 3    fluticasone propionate (FLONASE) 50 mcg/actuation nasal spray, Use 2 sprays in each nostril daily. , Disp: 1 Bottle, Rfl: 0    gabapentin (NEURONTIN) 100 mg capsule, Take 1 Cap by mouth three (3) times daily. Max Daily Amount: 300 mg., Disp: 90 Cap, Rfl: 0     No Known Allergies     There are no active problems to display for this patient. Review of Systems:  Constitutional: Negative for fatigue, malaise  Resp: Negative for cough, wheezing or SOB  CV: Negative for chest pain, dizziness or palpitations  GI: Negative for nausea or abdominal pain  MS: see HPI, Negative for acute myalgias or arthralgias   Neuro: Negative for HA, weakness or paresthesia  Psych: Negative for depression or anxiety       Assessment/Plan:  Details of this discussion including any medical advice provided: Patient advised as a precaution to stay at home, practice regular hand washing with soap and warm water and to wear a mask and utilize social distancing when necessary to be out in public places. ICD-10-CM ICD-9-CM    1. Acute right-sided low back pain without sciatica  M54.5 724.2 cyclobenzaprine (FLEXERIL) 10 mg tablet   2. Neck pain  M54.2 723.1 predniSONE (DELTASONE) 20 mg tablet   3. Acute pain of left shoulder  M25.512 719.41 predniSONE (DELTASONE) 20 mg tablet       Symptomatic therapy suggested: call prn if symptoms persist or worsen. Total Time: minutes: 11-20 minutes was spent on phone discussing above problems and plan. Patient medical history, prior OV notes, vitals flow sheet, lab results, medications, and allergies were reviewed during this encounter. For phone encounters:  I affirm this is a patient initiated episode with an established Patient who has not had a related appointment within my department in the past 7 days or scheduled within the next 24 hours.     Note: not billable if this call serves to triage the patient into an appointment for the relevant concern        MD VANDA Pedroza & GHULAM MARRERO [facility]  09/22/20

## 2020-09-11 NOTE — PROGRESS NOTES
1. Have you been to the ER, urgent care clinic since your last visit? Hospitalized since your last visit? No    2. Have you seen or consulted any other health care providers outside of the [address] [place] [address] since your last visit? Include any pap smears or colon screening.  No      Health Maintenance Due   Topic Date Due    Pneumococcal 0-64 years (1 of 1 - PPSV23) 09/16/1984    DTaP/Tdap/Td series (1 - Tdap) 09/16/1999    Lipid Screen  09/16/2018    Flu Vaccine (1) 09/01/2020

## 2020-10-28 ENCOUNTER — VIRTUAL VISIT (OUTPATIENT)
Dept: FAMILY MEDICINE CLINIC | Age: 40
End: 2020-10-28
Payer: MEDICAID

## 2020-10-28 ENCOUNTER — TELEPHONE (OUTPATIENT)
Dept: FAMILY MEDICINE CLINIC | Age: 40
End: 2020-10-28

## 2020-10-28 DIAGNOSIS — M25.50 ARTHRALGIA OF MULTIPLE JOINTS: Primary | ICD-10-CM

## 2020-10-28 DIAGNOSIS — M25.561 RIGHT KNEE PAIN, UNSPECIFIED CHRONICITY: ICD-10-CM

## 2020-10-28 DIAGNOSIS — M25.512 LEFT SHOULDER PAIN, UNSPECIFIED CHRONICITY: ICD-10-CM

## 2020-10-28 DIAGNOSIS — M54.50 BILATERAL LOW BACK PAIN WITHOUT SCIATICA, UNSPECIFIED CHRONICITY: ICD-10-CM

## 2020-10-28 PROCEDURE — 99442 PR PHYS/QHP TELEPHONE EVALUATION 11-20 MIN: CPT | Performed by: FAMILY MEDICINE

## 2020-10-28 NOTE — PROGRESS NOTES
Chief Complaint   Patient presents with    Arthritis     1. Have you been to the ER, urgent care clinic since your last visit? Hospitalized since your last visit? No    2. Have you seen or consulted any other health care providers outside of the [address] [place] [address] since your last visit? Include any pap smears or colon screening.  No    Reviewed record in preparation for visit and have necessary documentation  Pt did not bring medication to office visit for review  Information was given to pt on Advanced Directives, Living Will  Information was given on Shingles Vaccine  Opportunity was given for questions  Goals that were addressed and/or need to be completed after this appointment include:     Health Maintenance Due   Topic Date Due    Pneumococcal 0-64 years (1 of 1 - PPSV23) 09/16/1984    DTaP/Tdap/Td series (1 - Tdap) 09/16/1999    Lipid Screen  09/16/2018    Flu Vaccine (1) 09/01/2020

## 2020-10-30 NOTE — PROGRESS NOTES
Ying Rodriguez is a 41 y.o. male evaluated via telephone on 10/30/20. Patient Identity confirmed by . Telephone encounter done in lieu of office visit due to extraordinary circumstances. A state of national and state emergency has been declared by the President and the [place] due to the [employer] pandemic. Pursuant to the emergency declaration under the [facility] [address] authority and the [employer] and [employer] Act, this Virtual  Visit was conducted, with patient's consent, to reduce the patient's risk of exposure to COVID-19 and provide continuity of care for an established patient. Janes Tran LPN coordinated virtual visit    Consent:  Patient and/or health care decision maker is aware that he/she may receive a bill for this telephone encounter, depending on his insurance coverage, and has provided verbal consent to proceed: Yes    Physician Location: Office  Patient Location: Home    CC: back spasms  Information gathered from patient and/or health care decision maker. HPI: Ying Rodriguez is a 41 y.o. male who was evaluated by synchronous (real-time) audio technology from his/her home. He complains of multiple arthralgias and muscle spasms. He says both kbnee Joint pain is positional with movement, without radiation . Pain is improved with rest. Numbness, tingling, weakness is not present  . Encounter Diagnoses   Name Primary?  Arthralgia of multiple joints Yes    Right knee pain, unspecified chronicity     Left shoulder pain, unspecified chronicity     Bilateral low back pain without sciatica, unspecified chronicity          Current Outpatient Medications:     cyclobenzaprine (FLEXERIL) 10 mg tablet, Take 1 Tab by mouth three (3) times daily as needed for Muscle Spasm(s). , Disp: 90 Tab, Rfl: 0    predniSONE (DELTASONE) 20 mg tablet, Take 60 mg by mouth daily. , Disp: 15 Tab, Rfl: 0   meloxicam (MOBIC) 15 mg tablet, Take 1 Tab by mouth daily. , Disp: 30 Tab, Rfl: 3    gabapentin (NEURONTIN) 100 mg capsule, Take 1 Cap by mouth three (3) times daily. Max Daily Amount: 300 mg., Disp: 90 Cap, Rfl: 0     No Known Allergies     There are no active problems to display for this patient. Review of Systems:  Constitutional: Negative for fatigue, malaise  Resp: Negative for cough, wheezing or SOB  CV: Negative for chest pain, dizziness or palpitations  GI: Negative for nausea or abdominal pain  MS: see HPI  Neuro: Negative for HA, weakness or paresthesia  Psych: Negative for depression or anxiety       Assessment/Plan:  Details of this discussion including any medical advice provided: Patient advised as a precaution to stay at home, practice regular hand washing with soap and warm water and to wear a mask and utilize social distancing when necessary to be out in public places. ICD-10-CM ICD-9-CM    1. Arthralgia of multiple joints  M25.50 719.49 RHEUMATOID FACTOR, QT      SED RATE (ESR)      BURT, DIRECT, W/REFLEX      CBC W/O DIFF      METABOLIC PANEL, COMPREHENSIVE      TSH 3RD GENERATION   2. Right knee pain, unspecified chronicity  M25.561 719.46 XR KNEE RT MAX 2 VWS   3. Left shoulder pain, unspecified chronicity  M25.512 719.41 XR SHOULDER LT AP/LAT MIN 2 V   4. Bilateral low back pain without sciatica, unspecified chronicity  M54.5 724.2 XR SPINE LUMB 2 OR 3 V       Symptomatic therapy suggested: call prn if symptoms persist or worsen. Total Time: minutes: 11-20 minutes was spent on phone discussing above problems and plan. Patient medical history, prior OV notes, vitals flow sheet, lab results, medications, and allergies were reviewed during this encounter. For phone encounters:  I affirm this is a patient initiated episode with an established Patient who has not had a related appointment within my department in the past 7 days or scheduled within the next 24 hours.     Note: not billable if this call serves to triage the patient into an appointment for the relevant concern        MD VANDA Keating & GHULAM MARRERO [facility]  10/30/20

## 2020-11-03 ENCOUNTER — TELEPHONE (OUTPATIENT)
Dept: FAMILY MEDICINE CLINIC | Age: 40
End: 2020-11-03

## 2020-11-03 NOTE — TELEPHONE ENCOUNTER
Requesting to speak with Dr. Kaylene Molina about SSD paperwork, needs it filled out to process claim

## 2020-11-04 NOTE — TELEPHONE ENCOUNTER
----- Message from [employer] sent at 11/4/2020  4:17 PM EST -----  Regarding: /Telephone  Contact: [phone]  General Message/Vendor Calls    Caller's first and last name:      Reason for call:SSD paperwork and LBP      Callback required yes/no and why:yes to confirm paperwork drop off      Best contact number(s):[phone]      Details to clarify the request:pt is trying to have this paperwork done soon      [employer]

## 2020-11-05 NOTE — TELEPHONE ENCOUNTER
Returned phone call to patient. If he calls back, please tell him he can drop his forms off. We will take a look at them and if it is something that the doctor can complete without another visit since he was just seen on 10/28/2020 we will. However, if it looks like he will need another appointment after we review the forms we will let him know.

## 2020-11-06 ENCOUNTER — TELEPHONE (OUTPATIENT)
Dept: FAMILY MEDICINE CLINIC | Age: 40
End: 2020-11-06

## 2020-11-06 ENCOUNTER — VIRTUAL VISIT (OUTPATIENT)
Dept: FAMILY MEDICINE CLINIC | Age: 40
End: 2020-11-06
Payer: MEDICAID

## 2020-11-06 DIAGNOSIS — F34.1 DYSTHYMIA: ICD-10-CM

## 2020-11-06 DIAGNOSIS — M25.50 ARTHRALGIA OF MULTIPLE JOINTS: Primary | ICD-10-CM

## 2020-11-06 PROCEDURE — 99443 PR PHYS/QHP TELEPHONE EVALUATION 21-30 MIN: CPT | Performed by: FAMILY MEDICINE

## 2020-11-06 RX ORDER — BUPROPION HYDROCHLORIDE 150 MG/1
150 TABLET ORAL
Qty: 30 TAB | Refills: 2 | Status: SHIPPED | OUTPATIENT
Start: 2020-11-06 | End: 2020-11-27 | Stop reason: SINTOL

## 2020-11-06 NOTE — TELEPHONE ENCOUNTER
Call made to patient. Dr. Dennis Becerra does not have an opening at 11:30. Rescheduled patient for afternoon appointment.

## 2020-11-06 NOTE — TELEPHONE ENCOUNTER
----- Message from [address] [place] sent at 11/6/2020  9:39 AM EST -----  Regarding: Dr. Helen Hammond Message/Vendor Calls    Caller's first and last name Pt      Reason for call:Virtual Visit      Callback required yes/no and why: Yes      Best contact number(s): [phone]      Details to clarify the request: Pt stated due to Internet connection pt was unable to complete virtual visit with Dr. Abigail Redd. Requesting to continue visit after 11:30 AM via phone only visit.       David Grammes Done

## 2020-11-06 NOTE — TELEPHONE ENCOUNTER
Patient has an appointment this afternoon. He states that he will drop the forms off before his appointment so Dr. Pebbles Guerrero can take a look at them.

## 2020-11-20 NOTE — PROGRESS NOTES
Lorri Snyder is a 41 y.o. male evaluated via telephone on 20. Patient Identity confirmed by . Telephone encounter done in lieu of office visit due to extraordinary circumstances. A state of national and state emergency has been declared by the President and the [place] due to the [employer] pandemic. Pursuant to the emergency declaration under the [facility] [place] [address] [facility] authority and the [employer] Act, this Virtual  Visit was conducted, with patient's consent, to reduce the patient's risk of exposure to COVID-19 and provide continuity of care for an established patient. Patricia Goldstein LPN coordinated virtual visit    Consent:  Patient and/or health care decision maker is aware that he/she may receive a bill for this telephone encounter, depending on his insurance coverage, and has provided verbal consent to proceed: Yes    Physician Location: Office  Patient Location: Home    CC: follow up  Information gathered from patient and/or health care decision maker. HPI: Lorri Snyder is a 41 y.o. male who was evaluated by synchronous (real-time) audio technology from his/her home. He is following up on his multiple arthralgias and muscle spasms. He asks about documentation for disability. Today he reports that he has been depressed and is ready to start medication for this. Denies HI/SI. Says he has had problems with his mood for years, however has tried to manage this on his own. He says he is trying to improve the quality of his life. Encounter Diagnoses   Name Primary?  Arthralgia of multiple joints Yes    Dysthymia          Current Outpatient Medications:     buPROPion XL (WELLBUTRIN XL) 150 mg tablet, Take 1 Tab by mouth every morning.  Indications: anxiousness associated with depression, Disp: 30 Tab, Rfl: 2    cyclobenzaprine (FLEXERIL) 10 mg tablet, Take 1 Tab by mouth three (3) times daily as needed for Muscle Spasm(s). , Disp: 90 Tab, Rfl: 0    meloxicam (MOBIC) 15 mg tablet, Take 1 Tab by mouth daily. , Disp: 30 Tab, Rfl: 3    gabapentin (NEURONTIN) 100 mg capsule, Take 1 Cap by mouth three (3) times daily. Max Daily Amount: 300 mg., Disp: 90 Cap, Rfl: 0     No Known Allergies     There are no active problems to display for this patient. Review of Systems:  Constitutional: Negative for fatigue, malaise  Resp: Negative for cough, wheezing or SOB  CV: Negative for chest pain, dizziness or palpitations  GI: Negative for nausea or abdominal pain  MS: see HPI  Neuro: Negative for HA, weakness or paresthesia  Psych: see HPI      Assessment/Plan:  Details of this discussion including any medical advice provided: Patient advised as a precaution to stay at home, practice regular hand washing with soap and warm water and to wear a mask and utilize social distancing when necessary to be out in public places. ICD-10-CM ICD-9-CM    1. Arthralgia of multiple joints  M25.50 719.49    2. Dysthymia  F34.1 300.4 REFERRAL TO PSYCHOLOGY       Symptomatic therapy suggested: call prn if symptoms persist or worsen. Total Time: minutes: 11-20 minutes was spent on phone discussing above problems and plan. Patient medical history, prior OV notes, vitals flow sheet, lab results, medications, and allergies were reviewed during this encounter. For phone encounters:  I affirm this is a patient initiated episode with an established Patient who has not had a related appointment within my department in the past 7 days or scheduled within the next 24 hours.     Note: not billable if this call serves to triage the patient into an appointment for the relevant concern        MD VANDA Jones & GHULAM MARRERO [facility]  11/20/20

## 2020-11-24 ENCOUNTER — TELEPHONE (OUTPATIENT)
Dept: FAMILY MEDICINE CLINIC | Age: 40
End: 2020-11-24

## 2020-11-24 NOTE — TELEPHONE ENCOUNTER
----- Message from [employer] sent at 11/24/2020 11:42 AM EST -----  Regarding: /Telephone  Contact: [phone]  General Message/Vendor Calls    Caller's first and last name:pt      Reason for call:fast heart rate, fatigue      Callback required yes/no and why:yes to discuss next steps      Best contact number(s):[phone]      Details to clarify the request:pt says that he has been feeling his heart rate be extremely fast, heavy breathing and has never has problems with BP before, no response to attempt to transfer to backline, please advise       [employer]

## 2020-11-24 NOTE — TELEPHONE ENCOUNTER
Returned call to patient. He states for several days he has been having episodes of rapid heart rate with heavy breathing. Took a \"muscle relaxer\" and feels some relief. He states that he has no way to check his BP or heart rate at home but he can feel his heart beating rapidly. I advised him of no available appointments and that he should be seen at urgent care or ER. He declines and states he would like to have first available appt in office due to transportation issues. I scheduled appt but strongly encouraged patient to go to urgent care or call 911 to go to ER if there is no transportation to urgent care. He ensured me that he would if episode happened again.

## 2020-11-27 ENCOUNTER — OFFICE VISIT (OUTPATIENT)
Dept: FAMILY MEDICINE CLINIC | Age: 40
End: 2020-11-27
Payer: MEDICAID

## 2020-11-27 VITALS
HEART RATE: 104 BPM | HEIGHT: 74 IN | WEIGHT: 226 LBS | BODY MASS INDEX: 29 KG/M2 | TEMPERATURE: 97.7 F | SYSTOLIC BLOOD PRESSURE: 126 MMHG | RESPIRATION RATE: 18 BRPM | OXYGEN SATURATION: 97 % | DIASTOLIC BLOOD PRESSURE: 82 MMHG

## 2020-11-27 DIAGNOSIS — R00.0 RAPID HEART RATE: ICD-10-CM

## 2020-11-27 DIAGNOSIS — F41.1 GENERALIZED ANXIETY DISORDER: Primary | ICD-10-CM

## 2020-11-27 DIAGNOSIS — Z09 HOSPITAL DISCHARGE FOLLOW-UP: ICD-10-CM

## 2020-11-27 PROCEDURE — 99214 OFFICE O/P EST MOD 30 MIN: CPT | Performed by: STUDENT IN AN ORGANIZED HEALTH CARE EDUCATION/TRAINING PROGRAM

## 2020-11-27 PROCEDURE — 1111F DSCHRG MED/CURRENT MED MERGE: CPT | Performed by: STUDENT IN AN ORGANIZED HEALTH CARE EDUCATION/TRAINING PROGRAM

## 2020-11-27 PROCEDURE — 93000 ELECTROCARDIOGRAM COMPLETE: CPT | Performed by: STUDENT IN AN ORGANIZED HEALTH CARE EDUCATION/TRAINING PROGRAM

## 2020-11-27 RX ORDER — HYDROXYZINE 50 MG/1
TABLET, FILM COATED ORAL
COMMUNITY
Start: 2020-11-25 | End: 2020-12-23 | Stop reason: ALTCHOICE

## 2020-11-27 RX ORDER — SERTRALINE HYDROCHLORIDE 50 MG/1
50 TABLET, FILM COATED ORAL DAILY
Qty: 30 TAB | Refills: 0 | Status: SHIPPED | OUTPATIENT
Start: 2020-11-27 | End: 2020-12-23

## 2020-11-27 NOTE — PROGRESS NOTES
Subjective  CC: Joi Melendez is an 41 y.o. male who presents regarding anxiety. Anxiety- Pt presented to [facility] ED on 11/25/20 for tachycardia. An extensive evaluation was preformed that included Normal TSH, CBC, BMP, and CXR. Negative Trop, and D-dimer with EKG with only sinus tach. His symptoms greatly improved with one time administration of Ativan. Of note the pt was recently started on Wellbutrin and he feels like he was experiencing tachycardia from it. When he would take it he would feel palpitations but then he has not had this in the last 3 days since hes stopped it. Since beginning that medicine he feels like his anxiety has increased. Substance abuse- The pt endorses mod-heavy EtOH use. Averaging about 4 drinks daily with more like 6 on weekends or special occassions. He endorses history of snorting cocaine but denies use in the last yr. Also was using marijuana but denies this in the last couple weeks. He has been decreasing his EtOH use and has been feeling some shakes. He denies h/o seizures. Allergies - reviewed:   No Known Allergies      Medications - reviewed:   Current Outpatient Medications   Medication Sig    sertraline (ZOLOFT) 50 mg tablet Take 1 Tab by mouth daily.  cyclobenzaprine (FLEXERIL) 10 mg tablet Take 1 Tab by mouth three (3) times daily as needed for Muscle Spasm(s).  meloxicam (MOBIC) 15 mg tablet Take 1 Tab by mouth daily.  hydrOXYzine HCL (ATARAX) 50 mg tablet TAKE 1 TABLET BY MOUTH THREE TIMES DAILY AS NEEDED FOR ANXIETY    gabapentin (NEURONTIN) 100 mg capsule Take 1 Cap by mouth three (3) times daily. Max Daily Amount: 300 mg. No current facility-administered medications for this visit. Past Medical History - reviewed:  No past medical history on file. Immunizations - reviewed: There is no immunization history on file for this patient.       ROS  Review of Systems : Review of Systems   Constitutional: Negative for chills and fever.   HENT: Negative for congestion and sore throat. Respiratory: Negative for cough and shortness of breath. Cardiovascular: Positive for palpitations. Negative for chest pain. Gastrointestinal: Negative for nausea and vomiting. Neurological: Negative for dizziness and headaches. Psychiatric/Behavioral: Positive for depression. Negative for suicidal ideas. The patient is nervous/anxious. Physical Exam  Visit Vitals  /82 (BP 1 Location: Right arm, BP Patient Position: Sitting)   Pulse (!) 104   Temp 97.7 °F (36.5 °C) (Temporal)   Resp 18   Ht 6' 2\" (1.88 m)   Wt 226 lb (102.5 kg)   SpO2 97%   BMI 29.02 kg/m²       General appearance - Alert, NAD. Head: Atraumatic. Normocephalic. No lymphadenopathy  Eyes: EOMI. Sclera white. Ears: Hearing grossly normal.    Nose: Nares patent, no polyps  Throat: pharynx clear, no exudates. Respiratory - LCTAB. No wheeze/rale/rhonchi  Heart - Normal rate, regular rhythm. No m/r/r  Abdomen - Soft, non tender. Non distended. Neurological - No focal deficits. Speech normal.   Musculoskeletal - Normal ROM, Gait normal.    Extremities - No LE edema. Distal pulses intact  Skin - normal coloration and normal turgor. No cyanosis, no rash. Assessment/Plan  1. Rapid heart rate- EKG now with normal sinus rhythm. Tachycardia has resolved and no other abnormalities noted. - AMB POC EKG ROUTINE W/ 12 LEADS, INTER & REP    2. Generalized anxiety disorder- Stopped Wellbutrin. It is noted ~11% of pts will experience tachycardia from Wellbutrin. Also it lowers the seizure threshold so will not use this medication in someone with h/o EtOH abuse and trying to cut back. He will return to follow up with Dr. Mohsen Johnston in 2-4 weeks for dose adjustment prn. Discussed possible GI side effects and to stick with treatment even if no benefit for a few weeks. He will also call insurance to try to determine what Psychology providers he can work with.    - sertraline (ZOLOFT) 50 mg tablet; Take 1 Tab by mouth daily. Dispense: 30 Tab; Refill: 0    3. Hospital discharge follow-up  - [place] DISCHARGE MEDS RECONCILED W/ CURRENT OUTPATIENT MED LIST          Follow-up and Dispositions    · Return in 2 weeks (on 12/11/2020), or if symptoms worsen or fail to improve. I have discussed the aforementioned diagnoses and plan with the patient in detail. I have provided information in person and/or in AVS. All questions answered prior to discharge.     Darus Castleman, MD  Family Medicine Resident  PGY 2

## 2020-11-27 NOTE — PROGRESS NOTES
1. Have you been to the ER, urgent care clinic since your last visit? Hospitalized since your last visit? Yes When: [facility]  for rapid heart rate     2. Have you seen or consulted any other health care providers outside of the [address] [place] [address] since your last visit? Include any pap smears or colon screening. No    Reviewed record in preparation for visit and have necessary documentation  Goals that were addressed and/or need to be completed during or after this appointment include     Health Maintenance Due   Topic Date Due    Pneumococcal 0-64 years (1 of 1 - PPSV23) 09/16/1984    DTaP/Tdap/Td series (1 - Tdap) 09/16/1999    Lipid Screen  09/16/2018    Flu Vaccine (1) 09/01/2020       Patient is accompanied by self I have received verbal consent from [facility] to discuss any/all medical information while they are present in the room.

## 2020-11-27 NOTE — PROGRESS NOTES
[address] Medicine Residency Attending Addendum:  Dr. David Rodgers MD,  the patient and I were not physically present during this encounter. The resident and I are concurrently monitoring the patient care through appropriate telecommunication technology. I discussed the findings, assessment and plan with the resident and agree with the resident's findings and plan as documented in the resident's note.       Elsie Scott MD

## 2020-12-01 ENCOUNTER — TELEPHONE (OUTPATIENT)
Dept: FAMILY MEDICINE CLINIC | Age: 40
End: 2020-12-01

## 2020-12-01 DIAGNOSIS — F34.1 DYSTHYMIA: ICD-10-CM

## 2020-12-01 DIAGNOSIS — F41.1 GENERALIZED ANXIETY DISORDER: Primary | ICD-10-CM

## 2020-12-01 NOTE — TELEPHONE ENCOUNTER
Pt needs a referral for anxiety. If you can make it for [facility], or [facility]. If not please call Pt.

## 2020-12-03 ENCOUNTER — VIRTUAL VISIT (OUTPATIENT)
Dept: FAMILY MEDICINE CLINIC | Age: 40
End: 2020-12-03
Payer: MEDICAID

## 2020-12-03 DIAGNOSIS — F41.1 GENERALIZED ANXIETY DISORDER: Primary | ICD-10-CM

## 2020-12-03 PROCEDURE — 99443 PR PHYS/QHP TELEPHONE EVALUATION 21-30 MIN: CPT | Performed by: STUDENT IN AN ORGANIZED HEALTH CARE EDUCATION/TRAINING PROGRAM

## 2020-12-03 RX ORDER — ALPRAZOLAM 0.5 MG/1
0.5 TABLET ORAL
Qty: 16 TAB | Refills: 0 | Status: SHIPPED | OUTPATIENT
Start: 2020-12-03 | End: 2020-12-23 | Stop reason: ALTCHOICE

## 2020-12-03 NOTE — PROGRESS NOTES
Brittaney Garcia  41 y.o. male  1980  [address]  [address] [place] [id number]  [id number]    [phone] (home) [phone] (work)     [address]:    Telephone Encounter  Kathy Wakefield MD       Encounter Date: 12/3/2020 at 2:49 PM    Consent: Brittaney Garcia, who was seen by synchronous (real-time) audio only technology, and/or his healthcare decision maker, is aware that this patient-initiated, Telehealth encounter on 12/3/2020 is a billable service, with coverage as determined by his insurance carrier. He is aware that he may receive a bill and has provided verbal consent to proceed: Yes. Chief Complaint   Patient presents with    Anxiety       History of Present Illness   Brittaney Garcia is a 41 y.o. male was evaluated by telephone. I communicated with the patient and/or health care decision maker about Anxiety. Generalized Anxiety Disorder- Patient with multiple panic attacks since our clinic visit on 11/27. The pt started taking prescribed Zoloft 11/28 but only took two doses. Then he presented to the [address] [place] [address] ED and diagnosed with a panic attack and treated with IV Xanax and was discharged with Metoprolol for heart rate and BP control. A few days later he presented to [facility] again with a panic attack and they prescribed him Alprazolam 0.5mg TID prn with 12 pills. He has been taking 2 a day and is feeling much better. The pt is requesting to see an 'anxiety specialist.' He states that he cannot wait for a long acting medicine to take effect. Instead needs something fast acting like the Xanax. Review of Systems   Review of Systems   Constitutional: Negative for chills and fever. HENT: Negative for congestion and sore throat. Respiratory: Negative for cough and shortness of breath. Cardiovascular: Negative for chest pain and palpitations. Gastrointestinal: Negative for nausea and vomiting. Neurological: Negative for dizziness and headaches. Psychiatric/Behavioral: The patient is nervous/anxious. Vitals/Objective:   General: Patient speaking in complete sentences without effort. Normal speech and cooperative. Due to this being a Virtual Check-in/Telephone evaluation, many elements of the physical examination are unable to be assessed. Assessment and Plan:   Time-based coding, delete if not needed: I spent at least 25 minutes with this established patient, and >50% of the time was spent counseling and/or coordinating care regarding anxiety  Total Time: minutes: 21-30 minutes    1. Generalized anxiety disorder- Again discussed that SSRI is first line treatment with the most benefits and least amount of side effects. Pt has presented to emergency medical care twice in the last week. Will prescribe additional Xanax low dose while beginning treatment with Zoloft prescribed last week. We had a lengthy discussion that the Xanax prescription is to be used very sparingly. It has an extensive side effect profile, especially in the setting of the pt's EtOH use. Understood that this is a short term Rx as SSRI reaches therapeutic levels. Of note pt's psychiatry referral was placed yesterday. - ALPRAZolam (XANAX) 0.5 mg tablet; Take 1 Tab by mouth two (2) times daily as needed for Anxiety. Max Daily Amount: 1 mg. Dispense: 16 Tab; Refill: 0        We discussed the expected course, resolution and complications of the diagnosis(es) in detail. Medication risks, benefits, costs, interactions, and alternatives were discussed as indicated. I advised him to contact the office if his condition worsens, changes or fails to improve as anticipated. He expressed understanding with the diagnosis(es) and plan. Patient understands that this encounter was a temporary measure, and the importance of further follow up and examination was emphasized. Patient verbalized understanding.        Patient informed to follow up: 2 weeks with myself    I affirm this is a Patient Initiated Episode with an Established Patient who has not had a related appointment within my department in the past 7 days or scheduled within the next 24 hours. Note: not billable if this call serves to triage the patient into an appointment for the relevant concern      Electronically Signed: Allyssa Hutchison MD  Providers location when delivering service: office    CPT:  95773 (5-10 minutes)  [phone] (11-20 minutes)  21  (21-30 minutes)    Medicare:  [address]      ICD-10-CM ICD-9-CM    1. Generalized anxiety disorder  F41.1 300.02 ALPRAZolam (XANAX) 0.5 mg tablet       Pursuant to the emergency declaration under the [facility] waiver authority and the [employer] Act, this Virtual  Visit was conducted, with patient's consent, to reduce the patient's risk of exposure to COVID-19 and provide continuity of care for an established patient. History   Patients past medical, surgical and family histories were personally reviewed and updated. No past medical history on file. No past surgical history on file.   Family History   Problem Relation Age of Onset    Cancer Mother      Social History     Socioeconomic History    Marital status:      Spouse name: Not on file    Number of children: Not on file    Years of education: Not on file    Highest education level: Not on file   Occupational History    Not on file   Social Needs    Financial resource strain: Not on file    Food insecurity     Worry: Not on file     Inability: Not on file    Transportation needs     Medical: Not on file     Non-medical: Not on file   Tobacco Use    Smoking status: Current Every Day Smoker     Packs/day: 0.25     Years: 15.00     Pack years: 3.75    Smokeless tobacco: Never Used   Substance and Sexual Activity    Alcohol use: Yes     Comment: social    Drug use: No     Comment: use to smoke marijuana years ago    Sexual activity: Not Currently   Lifestyle    Physical activity     Days per week: Not on file     Minutes per session: Not on file    Stress: Not on file   Relationships    Social connections     Talks on phone: Not on file     Gets together: Not on file     Attends Latter-day service: Not on file     Active member of club or organization: Not on file     Attends meetings of clubs or organizations: Not on file     Relationship status: Not on file    Intimate partner violence     Fear of current or ex partner: Not on file     Emotionally abused: Not on file     Physically abused: Not on file     Forced sexual activity: Not on file   Other Topics Concern    Not on file   Social History Narrative    ** Merged History Encounter **                 Current Medications/Allergies   Medications and Allergies reviewed:    Current Outpatient Medications   Medication Sig Dispense Refill    ALPRAZolam (XANAX) 0.5 mg tablet Take 1 Tab by mouth two (2) times daily as needed for Anxiety. Max Daily Amount: 1 mg. 16 Tab 0    hydrOXYzine HCL (ATARAX) 50 mg tablet TAKE 1 TABLET BY MOUTH THREE TIMES DAILY AS NEEDED FOR ANXIETY      sertraline (ZOLOFT) 50 mg tablet Take 1 Tab by mouth daily. 30 Tab 0    cyclobenzaprine (FLEXERIL) 10 mg tablet Take 1 Tab by mouth three (3) times daily as needed for Muscle Spasm(s). 90 Tab 0    meloxicam (MOBIC) 15 mg tablet Take 1 Tab by mouth daily. 30 Tab 3    gabapentin (NEURONTIN) 100 mg capsule Take 1 Cap by mouth three (3) times daily.  Max Daily Amount: 300 mg. 90 Cap 0     No Known Allergies

## 2020-12-07 ENCOUNTER — TELEPHONE (OUTPATIENT)
Dept: FAMILY MEDICINE CLINIC | Age: 40
End: 2020-12-07

## 2020-12-07 NOTE — TELEPHONE ENCOUNTER
SSV DOCUMENTATION to be filled out. Spoke to Dr. Katelin Buchanan about this. Want to know does he need to do a Routine and come in and have it filled out while he is there? Pt has been taken out of work and it is very important to get this documentation back to his job. Time sensitive. Dr. Katelin Buchanan is aware. Thank you very much.

## 2020-12-08 NOTE — PROGRESS NOTES
[address] Medicine Residency Attending Addendum:  Dr. Shital Garcia MD,  the patient and I were not physically present during this encounter. The resident and I are concurrently monitoring the patient care through appropriate telecommunication technology. I discussed the findings, assessment and plan with the resident and agree with the resident's findings and plan as documented in the resident's note. 40 yo male was evaluated for anxiety attacks. Script for Xanax was given for short teem and only for infrequent use. Advised against chronic use of Xanax given underlying ETOH. If prescribed further will need to get UDS and sign control substance agreement.      Colette Jimenez MD

## 2020-12-09 NOTE — TELEPHONE ENCOUNTER
Patient came by with disability form that was from an entity I did not recognize with a [place] address. It was similar to forms I have received in the past from law offices specializing in disability claims. I discussed this with Donald Aparicio who was performing triage duties and asked her to inform the patient that our office does not fill out paper work from such entities. As I had discussed previously with the patient, [facility] will request his medical records which has in fact occurred and these are the basis for his disability claim. Similarly any law office can request these if given permission by the patient.

## 2020-12-10 ENCOUNTER — VIRTUAL VISIT (OUTPATIENT)
Dept: FAMILY MEDICINE CLINIC | Age: 40
End: 2020-12-10
Payer: MEDICAID

## 2020-12-10 ENCOUNTER — TELEPHONE (OUTPATIENT)
Dept: FAMILY MEDICINE CLINIC | Age: 40
End: 2020-12-10

## 2020-12-10 DIAGNOSIS — F41.1 GENERALIZED ANXIETY DISORDER: Primary | ICD-10-CM

## 2020-12-10 PROCEDURE — 99442 PR PHYS/QHP TELEPHONE EVALUATION 11-20 MIN: CPT | Performed by: STUDENT IN AN ORGANIZED HEALTH CARE EDUCATION/TRAINING PROGRAM

## 2020-12-10 NOTE — PROGRESS NOTES
Siva Roman  41 y.o. male  1980  [address]  [address] [address]  [id number]    [phone] (home) [phone] (work)     [address]:    Telephone Encounter  Frankey Haw, MD       Encounter Date: 12/10/2020 at 2:22 PM    Consent: Siva Roman, who was seen by synchronous (real-time) audio only technology, and/or his healthcare decision maker, is aware that this patient-initiated, Telehealth encounter on 12/10/2020 is a billable service, with coverage as determined by his insurance carrier. He is aware that he may receive a bill and has provided verbal consent to proceed: Yes. Chief Complaint   Patient presents with    Anxiety       History of Present Illness   Siva Roman is a 41 y.o. male was evaluated by telephone. I communicated with the patient and/or health care decision maker about anxiety meds. Anxiety- Sertraline took one and he felt like it has been making him wake up, feels like his blood is boiling, heart beating fast, feet and hands are swelling. Has not taken any since. He has taken the 0.5mg Xanax about 1-2 times daily which have helped. No more trips to ED for panic attacks. Has gotten an appt with Mrs Safia Motley for next week in [facility] [address] [place] [address]. Review of Systems   Review of Systems   Constitutional: Negative for chills and fever. HENT: Negative for congestion and sore throat. Respiratory: Negative for cough and shortness of breath. Cardiovascular: Negative for chest pain and palpitations. Gastrointestinal: Negative for nausea and vomiting. Neurological: Negative for dizziness and headaches. Psychiatric/Behavioral: The patient is nervous/anxious and has insomnia. Vitals/Objective:   General: Patient speaking in complete sentences without effort. Normal speech and cooperative.        Due to this being a Virtual Check-in/Telephone evaluation, many elements of the physical examination are unable to be assessed. Assessment and Plan:   Time-based coding, delete if not needed: I spent at least 15 minutes with this established patient, and >50% of the time was spent counseling and/or coordinating care regarding anxiety  Total Time: minutes: 11-20 minutes    1. Generalized anxiety disorder- Pt is intolerant of Wellbutrin and Sertraline even with one dose. Discussed potential of starting sertraline at 25mg instead of 50mg. He does not feel comfortable taking this anymore. Discussed possibility of trying Buspar. He is tired of trying new meds. Pt will see therapy next week and has numbers to call for psychiatry. Pt requests for this provider to give further refills of Xanax in the future if need be. Unfortunately I will have to defer to psychiatry for this as he has failed two first line therapies with me and prefers not to try another agent. We discussed the expected course, resolution and complications of the diagnosis(es) in detail. Medication risks, benefits, costs, interactions, and alternatives were discussed as indicated. I advised him to contact the office if his condition worsens, changes or fails to improve as anticipated. He expressed understanding with the diagnosis(es) and plan. Patient understands that this encounter was a temporary measure, and the importance of further follow up and examination was emphasized. Patient verbalized understanding. Patient informed to follow up: prn    I affirm this is a Patient Initiated Episode with an Established Patient who has not had a related appointment within my department in the past 7 days or scheduled within the next 24 hours.   Note: not billable if this call serves to triage the patient into an appointment for the relevant concern      Electronically Signed: Francis Higuera MD  Providers location when delivering service: office    CPT:  15938 (5-10 minutes)  [phone] (11-20 minutes)  21  (21-30 minutes)    Medicare:   - Virtual Check-in      ICD-10-CM ICD-9-CM    1. Generalized anxiety disorder  F41.1 300.02        Pursuant to the emergency declaration under the [address] [place] [address] [facility] and the [employer] Act, this Virtual  Visit was conducted, with patient's consent, to reduce the patient's risk of exposure to COVID-19 and provide continuity of care for an established patient. History   Patients past medical, surgical and family histories were personally reviewed and updated. No past medical history on file. No past surgical history on file.   Family History   Problem Relation Age of Onset    Cancer Mother      Social History     Socioeconomic History    Marital status:      Spouse name: Not on file    Number of children: Not on file    Years of education: Not on file    Highest education level: Not on file   Occupational History    Not on file   Social Needs    Financial resource strain: Not on file    Food insecurity     Worry: Not on file     Inability: Not on file    Transportation needs     Medical: Not on file     Non-medical: Not on file   Tobacco Use    Smoking status: Current Every Day Smoker     Packs/day: 0.25     Years: 15.00     Pack years: 3.75    Smokeless tobacco: Never Used   Substance and Sexual Activity    Alcohol use: Yes     Comment: social    Drug use: No     Comment: use to smoke marijuana years ago    Sexual activity: Not Currently   Lifestyle    Physical activity     Days per week: Not on file     Minutes per session: Not on file    Stress: Not on file   Relationships    Social connections     Talks on phone: Not on file     Gets together: Not on file     Attends Scientologist service: Not on file     Active member of club or organization: Not on file     Attends meetings of clubs or organizations: Not on file     Relationship status: Not on file    Intimate partner violence     Fear of current or ex partner: Not on file     Emotionally abused: Not on file     Physically abused: Not on file     Forced sexual activity: Not on file   Other Topics Concern    Not on file   Social History Narrative    ** Merged History Encounter **                 Current Medications/Allergies   Medications and Allergies reviewed:    Current Outpatient Medications   Medication Sig Dispense Refill    ALPRAZolam (XANAX) 0.5 mg tablet Take 1 Tab by mouth two (2) times daily as needed for Anxiety. Max Daily Amount: 1 mg. 16 Tab 0    hydrOXYzine HCL (ATARAX) 50 mg tablet TAKE 1 TABLET BY MOUTH THREE TIMES DAILY AS NEEDED FOR ANXIETY      sertraline (ZOLOFT) 50 mg tablet Take 1 Tab by mouth daily. 30 Tab 0    cyclobenzaprine (FLEXERIL) 10 mg tablet Take 1 Tab by mouth three (3) times daily as needed for Muscle Spasm(s). 90 Tab 0    meloxicam (MOBIC) 15 mg tablet Take 1 Tab by mouth daily. 30 Tab 3    gabapentin (NEURONTIN) 100 mg capsule Take 1 Cap by mouth three (3) times daily.  Max Daily Amount: 300 mg. 90 Cap 0     No Known Allergies

## 2020-12-10 NOTE — TELEPHONE ENCOUNTER
Pt states he was to call and let you know that he found his own mental health provider Dr. Bassam Betancourt in [place].  Will See her Tuesday at 10am

## 2020-12-16 ENCOUNTER — TELEPHONE (OUTPATIENT)
Dept: FAMILY MEDICINE CLINIC | Age: 40
End: 2020-12-16

## 2020-12-16 NOTE — TELEPHONE ENCOUNTER
Attempted to call x2. No answer. Uanble to leave voicemail. Patient had follow up with Dr. Sonali Knapp on 12/10/2020 and does not need the appt for tomorrow. It will be cancelled unless re-requested by the patient.

## 2020-12-21 ENCOUNTER — TELEPHONE (OUTPATIENT)
Dept: FAMILY MEDICINE CLINIC | Age: 40
End: 2020-12-21

## 2020-12-21 RX ORDER — METOPROLOL SUCCINATE 25 MG/1
TABLET, EXTENDED RELEASE ORAL
COMMUNITY
Start: 2020-11-29 | End: 2020-12-23 | Stop reason: SDUPTHER

## 2020-12-21 NOTE — TELEPHONE ENCOUNTER
Returned call to patient. He states he needs refill for metoprolol and counselor in [place] recommends changing patient muscle relaxer. I advised him I would request her visit notes and recommendations. He also requet to have a refill on the xanax. He has 2.5 pills left and has at appt scheduled with psych on 1/22/2021 at 9:15AM. He states he only takes as needed. Appt scheduled with Dr. Georgia Bazzi 12/23/2020 for med eval/refill. Patient states the metoprolol was prescribed by urgent care.

## 2020-12-22 ENCOUNTER — TELEPHONE (OUTPATIENT)
Dept: FAMILY MEDICINE CLINIC | Age: 40
End: 2020-12-22

## 2020-12-22 NOTE — TELEPHONE ENCOUNTER
Appointment has been scheduled. Patient's wife walked in to the office to schedule the patient a hospital fu apt with dr. Bel Aguila. He is scheduled for the next available apt Monday 11/4/19 but she is requesting to speak with Dr. Bel Aguila when he gets a chance.

## 2020-12-22 NOTE — TELEPHONE ENCOUNTER
Called and spoke to Susan Valdez, counselor in [place], regarding patient. She states that she recommended patient speak with PCP regarding medications changed due to he expressed he was having some agitation. I requested her send us visit notes but she states patient missed his virtual appt this week and has only had an intake visit. She strongly suggest and ask for Dr. Nadia Alas to suggest to patient that he continue with counseling sessions and f/u with psychiatrist. Patient states he had an upcoming appt with physiatrist at [facility]. Called them at [facility] location to confirm.  They confirmed patient does have an appt on 1/22/2021 at 9:15AM with Kathia Blankenship NP.

## 2020-12-23 ENCOUNTER — VIRTUAL VISIT (OUTPATIENT)
Dept: FAMILY MEDICINE CLINIC | Age: 40
End: 2020-12-23
Payer: MEDICAID

## 2020-12-23 DIAGNOSIS — F41.9 ANXIETY: Primary | ICD-10-CM

## 2020-12-23 DIAGNOSIS — R00.0 TACHYCARDIA: ICD-10-CM

## 2020-12-23 PROCEDURE — 99443 PR PHYS/QHP TELEPHONE EVALUATION 21-30 MIN: CPT | Performed by: FAMILY MEDICINE

## 2020-12-23 RX ORDER — METOPROLOL SUCCINATE 25 MG/1
TABLET, EXTENDED RELEASE ORAL
Qty: 30 TAB | Refills: 1 | Status: SHIPPED | OUTPATIENT
Start: 2020-12-23 | End: 2021-05-11 | Stop reason: SDUPTHER

## 2020-12-23 RX ORDER — CLONAZEPAM 0.5 MG/1
0.25 TABLET ORAL
Qty: 15 TAB | Refills: 0 | Status: SHIPPED | OUTPATIENT
Start: 2020-12-23 | End: 2021-01-05 | Stop reason: SDUPTHER

## 2020-12-23 NOTE — PROGRESS NOTES
Mely Foster is a 41 y.o. male evaluated via telephone on 20. Patient Identity confirmed by . Telephone encounter done in lieu of office visit due to extraordinary circumstances. A state of national and state emergency has been declared by the President and the [place] due to the [employer] pandemic. Pursuant to the emergency declaration under the [facility] [address] authority and the [employer] Act, this Virtual  Visit was conducted, with patient's consent, to reduce the patient's risk of exposure to COVID-19 and provide continuity of care for an established patient. Sibyl Moritz LPN coordinated virtual visit. She called and spoke to Janet Way, counselor in [place], regarding patient. She states that she recommended patient speak with PCP regarding medications as he expressed he was having some agitation. LPN requested she send us visit notes but she stated patient missed his virtual appt this week and has only had an intake visit. Patient stated he had an upcoming appt with Psychiatry at [facility]. LPN called them at [facility] and they confirmed patient does have an appointment on 2021 at 9:15 am with Olivier Green NP    Consent:  Patient and/or health care decision maker is aware that he/she may receive a bill for this telephone encounter, depending on his insurance coverage, and has provided verbal consent to proceed: Yes    Physician Location: Office  Patient Location: Home    CC: Medications  Information gathered from patient and/or health care decision maker. HPI: Mely Foster is a 41 y.o. male who was evaluated by synchronous (real-time) audio technology from his/her home. Patient scheduled to request medication refill. Patient was prescribed buproprion by me on 20 for dysthymia.  At that time he reported that he has been depressed and was ready to start medication for this, denied HI/SI and said he has had problems with his mood for years. He said he has tried to manage this on his own. There is a hx of substance and ETOH abuse. He said he no longer using drugs and trying to decrease ETOH use. At that time he said he is trying to improve the quality of his life. He has recently applied for SSI disability due to multiple arthralgias. Patient presented to [facility] ED on 11/25/20 for tachycardia. An extensive evaluation was preformed that included normal TSH, CBC, BMP, and CXR. Negative Trop, and D-dimer with EKG with only sinus tach. ED physician was of opinion symptoms were due to buproprion which was stopped. He was prescribed lorazepam in ED. He was diagnosed with anxiety disorder at that time and prescribed hydroxyzine. Prescribed metoprolol for tachycardia. Patient has a hx of intermittent tachycardia in office dating back to 5/11/13. He was referred to cardiology on 8/6/19, however he was a no show for that appointment    Patient had a follow up with another physician (Dr. Melissa Marin) in our office and was started on 50 mg sertraline. He returned to ED after start of sertraline on 11/29/20 with similar complaints. He reported continued daily ETOH at that time. However ED physician again attributed symptoms to medication rather than anxiety. Patient presented the next day (11/30/20) to [facility] for anxiety and tachycardia. He was diagnosed with panic disorder and prescribed alprazolam 0.5 mg TID, prn #15 which was filled that day. He followed up on 12/3/20 with Dr. Melissa Marin for his anxiety. At that time patient stated \"that he cannot wait for a long acting medicine to take effect. Instead needs something fast acting like the Xanax. \" referral to psychiatry made the day before. Dr. Melissa Marin prescribed 16 additional tabs of 0.5 mg alprazolam TID, prn.  Dr. Melissa Marin documented \"Will prescribe additional Xanax low dose while beginning treatment with Zoloft prescribed last week. We had a lengthy discussion that the Xanax prescription is to be used very sparingly. It has an extensive side effect profile, especially in the setting of the pt's EtOH use. Understood that this is a short term Rx as SSRI reaches therapeutic levels. \"    At follow up with Dr. Ginna Zhong one week later on 12/10/20 patient reported he had stopped sertraline. Patient stated Orin Baker is tired of trying new meds. \" Patient requested that Dr. Ginna Zhong give further refills of alprazolam in the future which was declined and deferred to psychiatry \"as he has failed two first line therapies with me and prefers not to try another agent. \"    On 12/21/20 patient called requesting refill of alprazolam stating he only 2.5 pills left and his appointment with psychiatry was not until 1/22/2021. He stated he only takes as needed. However he had been prescribed 31 tabs since 11/30/20. Patient evaluated through telephone encounter today. Informed that I would not refill alprazolam. Patient insistent that he needs medication until seen by psychiatry. Does not want to take other medications because he \"cannot wait for them to work and does not want to have a heart attack or stroke from them. \" Discussed that he should not expect that psychiatry will continue to write benzodiazepines, particularly given his hx of ETOH abuse. Explained that there are multiple medical options and substituting dependence on ETOH with dependence on benzodiazepine is not our goal. Explained that I would be the 3rd prescriber of benzodiazepine in 24 days and this has the appearance of drug seeking behavior and that his insistence on a refill today suggests he has already become reliant on these medications. Advised that he needed to come into office for lab work and UDS prior to any rx for a different benzodiazepine. I am doing this reluctantly in order to prevent further ED or urgent care encounters.  I explained that the prescribed medication is expected to last him until 1/22/21 and that I would provide no further refills. Patient expressed understanding. Urine drug screen done on 12/23/20 negative for benzodiazepines. This result unexpected given his insistence on refill that same day. Encounter Diagnoses   Name Primary?  Anxiety Yes    Tachycardia          Current Outpatient Medications:     metoprolol succinate (TOPROL-XL) 25 mg XL tablet, TAKE 1 TABLET BY MOUTH ONCE DAILY, Disp: 30 Tab, Rfl: 1    clonazePAM (KlonoPIN) 0.5 mg tablet, Take 0.5 Tabs by mouth two (2) times daily as needed for Anxiety. Max Daily Amount: 0.5 mg., Disp: 15 Tab, Rfl: 0     No Known Allergies      There are no active problems to display for this patient. Review of Systems:  Constitutional: Negative for fatigue, malaise  Resp: Negative for cough, wheezing or SOB  CV: see HPI, Negative for chest pain, dizziness or palpitations  Neuro: Negative for HA, weakness or paresthesia  Psych: see HPI      Assessment/Plan:  Details of this discussion including any medical advice provided: Patient advised as a precaution to stay at home, practice regular hand washing with soap and warm water and to wear a mask and utilize social distancing when necessary to be out in public places. ICD-10-CM ICD-9-CM    1. Anxiety  F41.9 300.00 10-DRUG SCREEN W/CONF      METABOLIC PANEL, COMPREHENSIVE      TSH 3RD GENERATION      CBC W/O DIFF      CBC W/O DIFF      TSH 3RD GENERATION      METABOLIC PANEL, COMPREHENSIVE      10-DRUG SCREEN W/CONF      clonazePAM (KlonoPIN) 0.5 mg tablet   2. Tachycardia  N96.0 751.1 METABOLIC PANEL, COMPREHENSIVE      LIPID PANEL      MAGNESIUM      MAGNESIUM      LIPID PANEL      METABOLIC PANEL, COMPREHENSIVE     Take medication only as prescribed. Symptomatic therapy suggested: call prn if symptoms persist or worsen. Total Time: minutes: 21-30 minutes was spent addressing above problems and plan.     Patient medical history, prior OV notes, vitals flow sheet, lab results, medications, and allergies were reviewed during this encounter. All of the patient's questions were addressed and answered to apparent satisfaction. The patient understands and agrees with our plan of care. The patient knows to call back if they have questions about the plan of care or if symptoms change. For phone encounters:  I affirm this is a patient initiated episode with an established Patient who has not had a related appointment within my department in the past 7 days or scheduled within the next 24 hours.     Note: not billable if this call serves to triage the patient into an appointment for the relevant concern        Fredia Rinne, MD [facility]  12/26/20

## 2020-12-26 LAB
ALBUMIN SERPL-MCNC: 4.4 G/DL (ref 3.5–5)
ALBUMIN/GLOB SERPL: 1.2 {RATIO} (ref 1.1–2.2)
ALP SERPL-CCNC: 61 U/L (ref 45–117)
ALT SERPL-CCNC: 35 U/L (ref 12–78)
AMPHETAMINES UR QL SCN: NEGATIVE NG/ML
ANION GAP SERPL CALC-SCNC: 5 MMOL/L (ref 5–15)
AST SERPL-CCNC: 16 U/L (ref 15–37)
BARBITURATES UR QL SCN: NEGATIVE NG/ML
BENZODIAZ UR QL SCN: NEGATIVE NG/ML
BILIRUB SERPL-MCNC: 0.6 MG/DL (ref 0.2–1)
BUN SERPL-MCNC: 15 MG/DL (ref 6–20)
BUN/CREAT SERPL: 14 (ref 12–20)
BZE UR QL SCN: NEGATIVE NG/ML
CALCIUM SERPL-MCNC: 9.3 MG/DL (ref 8.5–10.1)
CANNABINOIDS UR QL SCN: NEGATIVE NG/ML
CHLORIDE SERPL-SCNC: 101 MMOL/L (ref 97–108)
CHOLEST SERPL-MCNC: 278 MG/DL
CO2 SERPL-SCNC: 28 MMOL/L (ref 21–32)
CREAT SERPL-MCNC: 1.09 MG/DL (ref 0.7–1.3)
CREAT UR-MCNC: 62.5 MG/DL (ref 20–300)
ERYTHROCYTE [DISTWIDTH] IN BLOOD BY AUTOMATED COUNT: 12.6 % (ref 11.5–14.5)
GLOBULIN SER CALC-MCNC: 3.6 G/DL (ref 2–4)
GLUCOSE SERPL-MCNC: 87 MG/DL (ref 65–100)
HCT VFR BLD AUTO: 45.6 % (ref 36.6–50.3)
HDLC SERPL-MCNC: 69 MG/DL
HDLC SERPL: 4 {RATIO} (ref 0–5)
HGB BLD-MCNC: 14.6 G/DL (ref 12.1–17)
LDLC SERPL CALC-MCNC: 174.4 MG/DL (ref 0–100)
LIPID PROFILE,FLP: ABNORMAL
MAGNESIUM SERPL-MCNC: 2.5 MG/DL (ref 1.6–2.4)
MCH RBC QN AUTO: 32.8 PG (ref 26–34)
MCHC RBC AUTO-ENTMCNC: 32 G/DL (ref 30–36.5)
MCV RBC AUTO: 102.5 FL (ref 80–99)
METHADONE UR QL SCN: NEGATIVE NG/ML
NRBC # BLD: 0 K/UL (ref 0–0.01)
NRBC BLD-RTO: 0 PER 100 WBC
OPIATES UR QL SCN: NEGATIVE NG/ML
OXYCODONE+OXYMORPHONE UR QL SCN: NEGATIVE NG/ML
PCP UR QL: NEGATIVE NG/ML
PH UR: 5.1 [PH] (ref 4.5–8.9)
PLATELET # BLD AUTO: 213 K/UL (ref 150–400)
PLEASE NOTE:, 733157: NORMAL
PMV BLD AUTO: 10.2 FL (ref 8.9–12.9)
POTASSIUM SERPL-SCNC: 4.5 MMOL/L (ref 3.5–5.1)
PROPOXYPH UR QL SCN: NEGATIVE NG/ML
PROT SERPL-MCNC: 8 G/DL (ref 6.4–8.2)
RBC # BLD AUTO: 4.45 M/UL (ref 4.1–5.7)
SODIUM SERPL-SCNC: 134 MMOL/L (ref 136–145)
TRIGL SERPL-MCNC: 173 MG/DL (ref ?–150)
TSH SERPL DL<=0.05 MIU/L-ACNC: 0.56 UIU/ML (ref 0.36–3.74)
VLDLC SERPL CALC-MCNC: 34.6 MG/DL
WBC # BLD AUTO: 4.9 K/UL (ref 4.1–11.1)

## 2021-01-04 ENCOUNTER — TELEPHONE (OUTPATIENT)
Dept: FAMILY MEDICINE CLINIC | Age: 41
End: 2021-01-04

## 2021-01-05 DIAGNOSIS — F41.9 ANXIETY: ICD-10-CM

## 2021-01-05 NOTE — TELEPHONE ENCOUNTER
----- Message from Ward Kramer sent at 1/4/2021  1:55 PM EST -----  Regarding: Dr Bessy Hair telephone  General Message/Vendor Calls    Caller's first and last name: [employer]      Reason for call: Lab result explanation      Callback required yes/no and why: Lab result explanation      Best contact number(s):[phone]      Details to clarify the request: Pt is requesting a call back for explanation of lab results.       Ward Kramer
Attempted to call. No answer. unable to leave message.
Attempted to call. unable to leave msg. mailbox is full.
(3) adequate

## 2021-01-06 NOTE — TELEPHONE ENCOUNTER
Pt is calling back about his prescription. Gave Pt 48 hours protocol. He states he understands. He is asking Nurse Elisa Frazier to please call him back. Pt states he has no more left. Thanks.

## 2021-01-07 RX ORDER — CLONAZEPAM 0.5 MG/1
0.25 TABLET ORAL
Qty: 15 TAB | Refills: 0 | Status: SHIPPED | OUTPATIENT
Start: 2021-01-07 | End: 2021-12-19

## 2021-01-07 NOTE — TELEPHONE ENCOUNTER
----- Message from Davion Hartmann sent at 1/6/2021  5:12 PM EST -----  Regarding: Medication Refill  Contact: [phone]  Medication Refill    Caller (if not patient): NA      Relationship of caller (if not patient): DARVIN      Best contact number(s):[phone]      Name of medication and dosage if known: \"Clonazepam 0.25 mg\"         Is patient out of this medication (yes/no): No      Pharmacy name: [facility] listed in chart? (yes/no): Yes  Pharmacy phone number: NA      Details to clarify the request: Patient is requesting refill of Rx today due to running out and using last dose tonight. Patient has been advised of 48 business hr. Time frame.       Davion Hartmann

## 2021-03-16 ENCOUNTER — NURSE TRIAGE (OUTPATIENT)
Dept: OTHER | Facility: CLINIC | Age: 41
End: 2021-03-16

## 2021-03-16 ENCOUNTER — TELEPHONE (OUTPATIENT)
Dept: FAMILY MEDICINE CLINIC | Age: 41
End: 2021-03-16

## 2021-03-16 NOTE — TELEPHONE ENCOUNTER
----- Message from Jonah Bal sent at 3/16/2021 11:53 AM EDT -----  Regarding: Dr Peter Eye  Level 1/Escalated Issue      Caller's first and last name and relationship (if not the patient):      Best contact number(s):[phone]      What are the symptoms: pt Triaged they advised pt speak with nurse fatigue after taking Anti Anxiety Medication      Transfer successful - yes/no (include outcome): no      Transfer declined - yes/no (include reason): no      Was caller advised to seek appropriate level of care - yes/no: yes      Details to clarify the request:        Jonah Bal

## 2021-03-16 NOTE — TELEPHONE ENCOUNTER
Brief description of triage:Has been having changes in his body the last 2 weeks- anxiety attacks and prescription given to him for this. Fatigue is present and is walking slower. Triage indicates for no triage as he has fatigue that has been present for 2 weeks and is associated with the onset of his anti anxiety medication. Care advice provided, patient verbalizes understanding; denies any other questions or concerns; instructed to call back for any new or worsening symptoms. Writer provided warm transfer [facility]   at [facility] for appointment scheduling. Attention Provider: Thank you for allowing me to participate in the care of your patient. The patient was connected to triage from [facility]. Please do not respond through this encounter as the response is not directed to a shared pool. Reason for Disposition   Requesting regular office appointment    Answer Assessment - Initial Assessment Questions  1. REASON FOR CALL or QUESTION: \"What is your reason for calling today? \" or \"How can I best help you? \" or \"What question do you have that I can help answer? \"      Fatigue starting 2 weeks ago associated with his new anti anxiety medication    Protocols used: INFORMATION ONLY CALL - NO TRIAGE-ADULT-  see above documentation

## 2021-04-02 ENCOUNTER — NURSE TRIAGE (OUTPATIENT)
Dept: OTHER | Facility: CLINIC | Age: 41
End: 2021-04-02

## 2021-04-02 NOTE — TELEPHONE ENCOUNTER
Reason for Disposition   MILD difficulty breathing (e.g., minimal/no SOB at rest, SOB with walking, pulse <100)    Answer Assessment - Initial Assessment Questions  1. COVID-19 DIAGNOSIS: \"Who made your Coronavirus (COVID-19) diagnosis? \" \"Was it confirmed by a positive lab test?\" If not diagnosed by a HCP, ask \"Are there lots of cases (community spread) where you live? \" (See public health department website, if unsure)      Unsure    2. COVID-19 EXPOSURE: \"Was there any known exposure to COVID before the symptoms began? \" CDC Definition of close contact: within 6 feet (2 meters) for a total of 15 minutes or more over a 24-hour period. No known exposure    3. ONSET: \"When did the COVID-19 symptoms start? \"       3-4 months    4. WORST SYMPTOM: \"What is your worst symptom? \" (e.g., cough, fever, shortness of breath, muscle aches)      Short of breath at times. Weakness    5. COUGH: \"Do you have a cough? \" If so, ask: \"How bad is the cough? \"        Occasional     6. FEVER: \"Do you have a fever? \" If so, ask: \"What is your temperature, how was it measured, and when did it start? \"      No fever    7. RESPIRATORY STATUS: \"Describe your breathing? \" (e.g., shortness of breath, wheezing, unable to speak)       Short of breath when moving around, wheezing at times     8. BETTER-SAME-WORSE: Vee Mcmahon you getting better, staying the same or getting worse compared to yesterday? \"  If getting worse, ask, \"In what way? \"      Getting a little better    9. HIGH RISK DISEASE: \"Do you have any chronic medical problems? \" (e.g., asthma, heart or lung disease, weak immune system, obesity, etc.)     HLD, HTN, heart murmer that has not followed up on    10. PREGNANCY: \"Is there any chance you are pregnant? \" \"When was your last menstrual period? \"        N/a    11. OTHER SYMPTOMS: \"Do you have any other symptoms? \"  (e.g., chills, fatigue, headache, loss of smell or taste, muscle pain, sore throat; new loss of smell or taste especially support the diagnosis of COVID-19)        Anxiety, fatigue, weakness    Protocols used: CORONAVIRUS (COVID-19) DIAGNOSED OR SUSPECTED-ADULT-AH    Patient called [facility] with red flag complaint. Brief description of triage: see above    Triage indicates for patient to go to ED    Care advice provided, patient verbalizes understanding; denies any other questions or concerns; instructed to call back for any new or worsening symptoms. Attention Provider: Thank you for allowing me to participate in the care of your patient. The patient was connected to triage from [employer]. Please do not respond through this encounter as the response is not directed to a shared pool.

## 2021-05-11 NOTE — TELEPHONE ENCOUNTER
----- Message from Mansi Black sent at 5/10/2021  4:59 PM EDT -----  Regarding: / Refill  Medication Refill    Caller (if not patient): N/A      Relationship of caller (if not patient): N/A      Best contact number(s): [phone]      Name of medication and dosage if known: \"Metoprolol\" 25mg      Is patient out of this medication (yes/no): yes      Pharmacy name: [address] listed in chart? (yes/no): yes  Pharmacy phone number: unknown      Details to clarify the request: N/A      Mansi Black

## 2021-05-13 RX ORDER — METOPROLOL SUCCINATE 25 MG/1
TABLET, EXTENDED RELEASE ORAL
Qty: 30 TAB | Refills: 2 | Status: SHIPPED | OUTPATIENT
Start: 2021-05-13 | End: 2021-05-19 | Stop reason: SDUPTHER

## 2021-05-19 ENCOUNTER — OFFICE VISIT (OUTPATIENT)
Dept: FAMILY MEDICINE CLINIC | Age: 41
End: 2021-05-19
Payer: MEDICAID

## 2021-05-19 VITALS
WEIGHT: 253 LBS | DIASTOLIC BLOOD PRESSURE: 87 MMHG | HEART RATE: 104 BPM | TEMPERATURE: 98.3 F | OXYGEN SATURATION: 98 % | HEIGHT: 74 IN | RESPIRATION RATE: 16 BRPM | SYSTOLIC BLOOD PRESSURE: 119 MMHG | BODY MASS INDEX: 32.47 KG/M2

## 2021-05-19 DIAGNOSIS — Z72.0 TOBACCO USE: ICD-10-CM

## 2021-05-19 DIAGNOSIS — G47.19 EXCESSIVE DAYTIME SLEEPINESS: Primary | ICD-10-CM

## 2021-05-19 DIAGNOSIS — E78.2 MIXED HYPERLIPIDEMIA: ICD-10-CM

## 2021-05-19 DIAGNOSIS — R00.0 TACHYCARDIA: ICD-10-CM

## 2021-05-19 DIAGNOSIS — F41.9 ANXIETY: ICD-10-CM

## 2021-05-19 PROCEDURE — 99214 OFFICE O/P EST MOD 30 MIN: CPT | Performed by: FAMILY MEDICINE

## 2021-05-19 RX ORDER — ARIPIPRAZOLE 10 MG/1
TABLET ORAL
COMMUNITY
Start: 2021-04-21 | End: 2021-12-19

## 2021-05-19 RX ORDER — FAMOTIDINE 20 MG/1
TABLET, FILM COATED ORAL
COMMUNITY
Start: 2021-05-12 | End: 2021-12-19

## 2021-05-19 RX ORDER — METOPROLOL SUCCINATE 25 MG/1
TABLET, EXTENDED RELEASE ORAL
Qty: 30 TABLET | Refills: 2 | Status: SHIPPED | OUTPATIENT
Start: 2021-05-19 | End: 2021-06-14 | Stop reason: SDUPTHER

## 2021-05-19 RX ORDER — DICLOFENAC SODIUM 25 MG/1
TABLET, DELAYED RELEASE ORAL
COMMUNITY
Start: 2021-05-12 | End: 2021-12-19

## 2021-05-19 RX ORDER — ACAMPROSATE CALCIUM 333 MG/1
TABLET, DELAYED RELEASE ORAL
COMMUNITY
Start: 2021-02-26 | End: 2021-12-19

## 2021-05-19 NOTE — LETTER
NOTIFICATION RETURN TO WORK  
 
5/19/2021 4:25 PM 
 
Mr. Samir Fonseca [address] [place] [id number] To Whom It May Concern: 
 
Samir Fonseca is currently under the care of [facility] Bowers. He will return to work on 5/20/21. If there are questions or concerns please have the patient contact our office. Sincerely, Carlos A Amaro MD

## 2021-05-19 NOTE — PROGRESS NOTES
1. Have you been to the ER, urgent care clinic since your last visit? Hospitalized since your last visit? Yes [facility]    2. Have you seen or consulted any other health care providers outside of the [address] [place] [address] since your last visit? Include any pap smears or colon screening.  No  Reviewed record in preparation for visit and have necessary documentation  Pt did not bring medication to office visit for review    Goals that were addressed and/or need to be completed during or after this appointment include   Health Maintenance Due   Topic Date Due    Hepatitis C Screening  Never done    Pneumococcal 0-64 years (1 of 2 - PPSV23) Never done    COVID-19 Vaccine (1) Never done    DTaP/Tdap/Td series (1 - Tdap) Never done

## 2021-05-24 NOTE — PROGRESS NOTES
Progress Note    Patient: Ryan Noguera MRN: [id number]  SSN: [id number]    YOB: 1980  Age: 41 y.o. Sex: male        Chief Complaint   Patient presents with    Sleep Problem     he is a 41y.o. year old male who presents with concern about FORREST. Patient cites daytime sleepiness and wife reports loud snoring and interrupted breathing. There has been weight gain since last OV. Patient with hx of anxiety for which he is being managed by psychiatry. He continues to smoke. BP Readings from Last 3 Encounters:   05/19/21 119/87   11/27/20 126/82   09/26/19 120/77     Wt Readings from Last 3 Encounters:   05/19/21 253 lb (114.8 kg)   11/27/20 226 lb (102.5 kg)   09/26/19 220 lb (99.8 kg)     Body mass index is 32.48 kg/m². Encounter Diagnoses   Name Primary?  Excessive daytime sleepiness Yes    Tachycardia     Mixed hyperlipidemia     Anxiety     Tobacco use        There is no problem list on file for this patient. History reviewed. No pertinent surgical history.   Social History     Socioeconomic History    Marital status:      Spouse name: Not on file    Number of children: Not on file    Years of education: Not on file    Highest education level: Not on file   Occupational History    Not on file   Tobacco Use    Smoking status: Current Every Day Smoker     Packs/day: 0.25     Years: 15.00     Pack years: 3.75    Smokeless tobacco: Never Used   Substance and Sexual Activity    Alcohol use: Yes     Comment: social    Drug use: No     Comment: use to smoke marijuana years ago    Sexual activity: Not Currently   Other Topics Concern    Not on file   Social History Narrative    ** Merged History Encounter **          Social Determinants of Health     Financial Resource Strain:     Difficulty of Paying Living Expenses:    Food Insecurity:     Worried About Running Out of Food in the Last Year:     [address] in the Last Year:    Transportation Needs:     Lack of Transportation (Medical):  Lack of Transportation (Non-Medical):    Physical Activity:     Days of Exercise per Week:     Minutes of Exercise per Session:    Stress:     Feeling of Stress :    Social Connections:     Frequency of Communication with Friends and Family:     Frequency of Social Gatherings with Friends and Family:     Attends L[employer] Services:     Active Member of Clubs or Organizations:     Attends Club or Organization Meetings:     Marital Status:    Intimate Partner Violence:     Fear of Current or Ex-Partner:     Emotionally Abused:     Physically Abused:     Sexually Abused:      Family History   Problem Relation Age of Onset    Cancer Mother      Current Outpatient Medications   Medication Sig    acamprosate (CAMPRAL) 333 mg tablet TAKE 2 TABLETS BY MOUTH THREE TIMES DAILY    ARIPiprazole (ABILIFY) 10 mg tablet TAKE 1 TABLET BY MOUTH ONCE DAILY    famotidine (PEPCID) 20 mg tablet TAKE 1 TABLET BY MOUTH TWICE DAILY    diclofenac EC (VOLTAREN) 25 mg EC tablet TAKE 1 TABLET BY MOUTH TWICE DAILY AS NEEDED FOR PAIN    metoprolol succinate (TOPROL-XL) 25 mg XL tablet TAKE 1 TABLET BY MOUTH ONCE DAILY    clonazePAM (KlonoPIN) 0.5 mg tablet Take 0.5 Tabs by mouth two (2) times daily as needed for Anxiety. Max Daily Amount: 0.5 mg. Any further refills will need to be prescribed by specialist.     No current facility-administered medications for this visit.      No Known Allergies    Review of Systems:  Constitutional: Negative for fatigue, malaise  Resp: Negative for cough, wheezing or SOB  CV: Negative for chest pain, dizziness or palpitations  GI: Negative for nausea or abdominal pain  MS: Negative for acute myalgias or arthralgias   Neuro: Negative for HA, weakness or paresthesia  Psych: Negative for depression or anxiety     Vitals:    05/19/21 1546   BP: 119/87   Pulse: (!) 104   Resp: 16   Temp: 98.3 °F (36.8 °C)   TempSrc: Temporal   SpO2: 98%   Weight: 253 lb (114.8 kg) Height: 6' 2\" (1.88 m)       Physical Examination:  General: Well developed, well nourished, in no acute distress  Head: Normocephalic, atraumatic  Eyes: Sclera clear, EOMI  Neck: Normal range of motion  Respiratory: symmetrical, unlabored effort  Cardiovascular: Regular rate and rhythm  Extremities: Full range of motion, normal gait  Neurologic: No focal deficits  Psych: Active, alert and oriented. Flat affect      ICD-10-CM ICD-9-CM    1. Excessive daytime sleepiness  G47.19 780.54 REFERRAL TO SLEEP STUDIES   2. Tachycardia  R00.0 785.0    3. Mixed hyperlipidemia  E78.2 272.2    4. Anxiety  F41.9 300.00    5. Tobacco use  Z72.0 305.1        Plan of care:  Diagnoses were discussed in detail with patient. Medication risks/benefits/side effects discussed with patient. All of the patient's questions were addressed and answered to apparent satisfaction. The patient understands and agrees with our plan of care. The patient knows to call back if they have questions about the plan of care or if symptoms change. The patient received an After-Visit Summary which contains VS, diagnoses, orders, allergy and medication lists. No future appointments.

## 2021-06-14 DIAGNOSIS — F41.9 ANXIETY: ICD-10-CM

## 2021-06-14 NOTE — TELEPHONE ENCOUNTER
----- Message from Du Sen sent at 6/14/2021  3:30 PM EDT -----  Regarding: Dr. Wilkins Chicas: [phone]  Medication Refill    Caller (if not patient): Pt.       Relationship of caller (if not patient): N/a. Best contact number(s): [phone]      Name of medication and dosage if known: \"Clonazepam\" 1 mg, \"Metoprolol\" 25 mg. Is patient out of this medication (yes/no): Yes. [facility] Pharmacy listed in chart? (yes/no): Yes. Pharmacy phone number: [phone]      Details to clarify the request: N/a.       Du Sen

## 2021-06-15 NOTE — TELEPHONE ENCOUNTER
Called patient. He was advised that he has remaining refills on his Metoprolol and per Dr Jaciel Dorantes further refills will need to be prescribed by specialist.\" Patient stated that he doesn't have an appt until July and will be out of his medication.

## 2021-06-17 RX ORDER — METOPROLOL SUCCINATE 25 MG/1
TABLET, EXTENDED RELEASE ORAL
Qty: 30 TABLET | Refills: 2 | Status: SHIPPED | OUTPATIENT
Start: 2021-06-17 | End: 2021-12-19

## 2021-06-17 RX ORDER — CLONAZEPAM 0.5 MG/1
0.25 TABLET ORAL
Qty: 15 TABLET | Refills: 0 | OUTPATIENT
Start: 2021-06-17

## 2021-06-17 NOTE — TELEPHONE ENCOUNTER
Attempted to call. No answer. Message left. Advise patient that. Dr Zhang Gomez sent prescription to pharmacy for Metoprolol Succinate but he will have to call the provider who prescribed clonazepam for refills.

## 2021-06-18 ENCOUNTER — TELEPHONE (OUTPATIENT)
Dept: FAMILY MEDICINE CLINIC | Age: 41
End: 2021-06-18

## 2021-06-18 NOTE — TELEPHONE ENCOUNTER
----- Message from Jareth Johnson sent at 6/18/2021  1:16 PM EDT -----  [place] with Yolanda Ponce at [address] [employer]. The reason why NP won't refill is because he was last seen there on 2/26/21. He has an appt scheduled with NP 7/7/21. [facility] filled 5/12 x 15d supply, Diclofenac 5/12 x 7d , Metoprolol ER 5/13 x 30d, Klonopin 5/31 x 30d and Abilify 5/31 x 30d. .  ----- Message -----  From: Jenifer Green MD  Sent: 6/17/2021   7:59 PM EDT  To: Jareth Johnson    Please call [address],[facility]. Patient had intake with Juaquin Nicole NP and was prescribed Acamprosate, Aripiprazole and clonazepam. He has asked for refill of clonazepam and scheduled appointment with myself. Please ask about any follow up appointment and reason why they would not refill clonazepam. Also call [place] and see what medications he has filled in the past 3 months.

## 2021-09-07 ENCOUNTER — HOSPITAL ENCOUNTER (EMERGENCY)
Age: 41
Discharge: HOME | End: 2021-09-07
Attending: STUDENT IN AN ORGANIZED HEALTH CARE EDUCATION/TRAINING PROGRAM
Payer: MEDICAID

## 2021-09-07 VITALS
DIASTOLIC BLOOD PRESSURE: 72 MMHG | RESPIRATION RATE: 15 BRPM | HEART RATE: 98 BPM | HEIGHT: 74 IN | OXYGEN SATURATION: 100 % | BODY MASS INDEX: 30.16 KG/M2 | SYSTOLIC BLOOD PRESSURE: 103 MMHG | WEIGHT: 235 LBS | TEMPERATURE: 99.9 F

## 2021-09-07 DIAGNOSIS — J41.0 SIMPLE CHRONIC BRONCHITIS (HCC): Primary | ICD-10-CM

## 2021-09-07 LAB
FLUAV AG NPH QL IA: NEGATIVE
FLUBV AG NOSE QL IA: NEGATIVE

## 2021-09-07 PROCEDURE — 74011250637 HC RX REV CODE- 250/637: Performed by: STUDENT IN AN ORGANIZED HEALTH CARE EDUCATION/TRAINING PROGRAM

## 2021-09-07 PROCEDURE — 87804 INFLUENZA ASSAY W/OPTIC: CPT

## 2021-09-07 PROCEDURE — U0005 INFEC AGEN DETEC AMPLI PROBE: HCPCS

## 2021-09-07 PROCEDURE — 99282 EMERGENCY DEPT VISIT SF MDM: CPT

## 2021-09-07 RX ORDER — ACETAMINOPHEN 500 MG
1000 TABLET ORAL
Status: COMPLETED | OUTPATIENT
Start: 2021-09-07 | End: 2021-09-07

## 2021-09-07 RX ADMIN — ACETAMINOPHEN 1000 MG: 500 TABLET ORAL at 13:59

## 2021-09-07 NOTE — ED PROVIDER NOTES
EMERGENCY DEPARTMENT HISTORY AND PHYSICAL EXAM      Date: 9/7/2021  Patient Name: Marycarmen Nj    History of Presenting Illness     Chief Complaint   Patient presents with    Cough    Chills       History Provided By: Patient    HPI: Marycarmen Nj, 41 y.o. male with a past medical history significant for hypertension presents to the ED with cc., body aches, fevers for 3 days. Patient states fever was 102 at home took Motrin and Tylenol with slight relief, also complaining of some midsternal heaviness, denies any shortness of breath denies any productive cough. There are no other complaints, changes, or physical findings at this time. PCP: Ferdinand Hawkins MD    No current facility-administered medications on file prior to encounter. Current Outpatient Medications on File Prior to Encounter   Medication Sig Dispense Refill    metoprolol succinate (TOPROL-XL) 25 mg XL tablet TAKE 1 TABLET BY MOUTH ONCE DAILY 30 Tablet 2    acamprosate (CAMPRAL) 333 mg tablet TAKE 2 TABLETS BY MOUTH THREE TIMES DAILY      ARIPiprazole (ABILIFY) 10 mg tablet TAKE 1 TABLET BY MOUTH ONCE DAILY      famotidine (PEPCID) 20 mg tablet TAKE 1 TABLET BY MOUTH TWICE DAILY      diclofenac EC (VOLTAREN) 25 mg EC tablet TAKE 1 TABLET BY MOUTH TWICE DAILY AS NEEDED FOR PAIN      clonazePAM (KlonoPIN) 0.5 mg tablet Take 0.5 Tabs by mouth two (2) times daily as needed for Anxiety. Max Daily Amount: 0.5 mg. Any further refills will need to be prescribed by specialist. 15 Tab 0       Past History     Past Medical History:  History reviewed. No pertinent past medical history. Past Surgical History:  History reviewed. No pertinent surgical history.     Family History:  Family History   Problem Relation Age of Onset    Cancer Mother        Social History:  Social History     Tobacco Use    Smoking status: Current Every Day Smoker     Packs/day: 0.25     Years: 15.00     Pack years: 3.75    Smokeless tobacco: Never Used Substance Use Topics    Alcohol use: Yes     Comment: social    Drug use: No     Comment: use to smoke marijuana years ago       Allergies:  No Known Allergies      Review of Systems     Review of Systems   Constitutional: Positive for chills and fever. Negative for activity change. HENT: Negative for congestion, sore throat and trouble swallowing. Eyes: Negative for photophobia and visual disturbance. Respiratory: Negative for cough, chest tightness and shortness of breath. Cardiovascular: Negative for chest pain and palpitations. Gastrointestinal: Negative for abdominal pain, nausea and vomiting. Genitourinary: Negative for dysuria, flank pain and frequency. Musculoskeletal: Positive for myalgias. Negative for arthralgias, back pain, neck pain and neck stiffness. Skin: Negative for color change and pallor. Neurological: Negative for dizziness, weakness, light-headedness, numbness and headaches. Physical Exam     Physical Exam  Vitals and nursing note reviewed. Constitutional:       Appearance: Normal appearance. He is normal weight. HENT:      Head: Normocephalic and atraumatic. Nose: Nose normal.      Mouth/Throat:      Mouth: Mucous membranes are moist.   Eyes:      Extraocular Movements: Extraocular movements intact. Pupils: Pupils are equal, round, and reactive to light. Cardiovascular:      Rate and Rhythm: Normal rate and regular rhythm. Pulses: Normal pulses. Heart sounds: Normal heart sounds. Pulmonary:      Effort: Pulmonary effort is normal.      Breath sounds: Normal breath sounds. Abdominal:      General: Abdomen is flat. Bowel sounds are normal.      Palpations: Abdomen is soft. Musculoskeletal:         General: No swelling or tenderness. Normal range of motion. Cervical back: Normal range of motion and neck supple. Skin:     General: Skin is warm and dry. Capillary Refill: Capillary refill takes less than 2 seconds. Neurological:      General: No focal deficit present. Mental Status: He is alert and oriented to person, place, and time. Cranial Nerves: No cranial nerve deficit. Sensory: No sensory deficit. Motor: No weakness. Psychiatric:         Mood and Affect: Mood normal.         Behavior: Behavior normal.         Diagnostic Study Results     Labs -     No results found for this or any previous visit (from the past 12 hour(s)). Radiologic Studies -   @lastxrresult@  CT Results  (Last 48 hours)    None        CXR Results  (Last 48 hours)    None            Medical Decision Making   I am the first provider for this patient. I reviewed the vital signs, available nursing notes, past medical history, past surgical history, family history and social history. Vital Signs-Reviewed the patient's vital signs. No data found. Records Reviewed: Nursing Notes    The patient presents with cough, body aches, fever with a differential diagnosis of COVID-19, influenza, bronchitis,      Provider Notes (Medical Decision Making):     MDM   75-year-old male, history of hypertension, presents emergency department for cough, fevers, body aches. School exam shows well-appearing male, febrile at triage saturations 100% respirations 20, no distress, unremarkable physical exam otherwise. We will obtain flu swab, COVID-19 test, this time no indication for imaging as patient saturations 100%, obvious respiratory distress. Patient is influenza negative, Covid still pending inform patient that Covid results return in 2-3 days, instructed to self isolate until resulted. ED Course:   Initial assessment performed. The patients presenting problems have been discussed, and they are in agreement with the care plan formulated and outlined with them. I have encouraged them to ask questions as they arise throughout their visit. PROCEDURES  Procedures         PLAN:  1.    Discharge Medication List as of 9/7/2021  4:11 PM        2. Follow-up Information     Follow up With Specialties Details Why Contact Info    Neal Arriaza MD Family Medicine In 3 days  [address] [address] [phone]      [facility] EMERGENCY DEPT Emergency Medicine  If symptoms worsen [address] [facility] [address] [phone]        Return to ED if worse     Diagnosis     Clinical Impression:   1.  Simple chronic bronchitis ([address]

## 2021-09-08 ENCOUNTER — PATIENT OUTREACH (OUTPATIENT)
Dept: CASE MANAGEMENT | Age: 41
End: 2021-09-08

## 2021-09-09 LAB — SARS-COV-2, COV2: NORMAL

## 2021-09-10 ENCOUNTER — PATIENT OUTREACH (OUTPATIENT)
Dept: CASE MANAGEMENT | Age: 41
End: 2021-09-10

## 2021-09-10 LAB
SARS-COV-2, XPLCVT: ABNORMAL
SOURCE, COVRS: ABNORMAL

## 2021-09-10 NOTE — ED NOTES
Patient notified of positive Covid test results after verification of date of birth and name. He has had no change in symptoms and was given education on Covid precautions, quarantine, follow-up and return to ER or urgent care for any worsening or change in symptoms.

## 2021-12-19 ENCOUNTER — HOSPITAL ENCOUNTER (EMERGENCY)
Age: 41
Discharge: HOME | End: 2021-12-19
Attending: EMERGENCY MEDICINE
Payer: MEDICAID

## 2021-12-19 ENCOUNTER — APPOINTMENT (OUTPATIENT)
Dept: GENERAL RADIOLOGY | Age: 41
End: 2021-12-19
Attending: EMERGENCY MEDICINE
Payer: MEDICAID

## 2021-12-19 VITALS
HEART RATE: 94 BPM | DIASTOLIC BLOOD PRESSURE: 85 MMHG | BODY MASS INDEX: 30.16 KG/M2 | TEMPERATURE: 97.1 F | OXYGEN SATURATION: 100 % | SYSTOLIC BLOOD PRESSURE: 135 MMHG | RESPIRATION RATE: 17 BRPM | WEIGHT: 235 LBS | HEIGHT: 74 IN

## 2021-12-19 DIAGNOSIS — B34.9 VIRAL SYNDROME: ICD-10-CM

## 2021-12-19 DIAGNOSIS — R53.83 FATIGUE, UNSPECIFIED TYPE: Primary | ICD-10-CM

## 2021-12-19 DIAGNOSIS — R11.2 NON-INTRACTABLE VOMITING WITH NAUSEA, UNSPECIFIED VOMITING TYPE: ICD-10-CM

## 2021-12-19 LAB
ALBUMIN SERPL-MCNC: 3.6 G/DL (ref 3.4–5)
ALBUMIN/GLOB SERPL: 1 {RATIO} (ref 0.8–1.7)
ALP SERPL-CCNC: 67 U/L (ref 45–117)
ALT SERPL-CCNC: 28 U/L (ref 16–61)
ANION GAP SERPL CALC-SCNC: 2 MMOL/L (ref 3–18)
AST SERPL-CCNC: 13 U/L (ref 10–38)
ATRIAL RATE: 73 BPM
BASOPHILS # BLD: 0 K/UL (ref 0–0.1)
BASOPHILS NFR BLD: 0 % (ref 0–2)
BILIRUB SERPL-MCNC: 0.9 MG/DL (ref 0.2–1)
BUN SERPL-MCNC: 8 MG/DL (ref 7–18)
BUN/CREAT SERPL: 7 (ref 12–20)
CALCIUM SERPL-MCNC: 9.7 MG/DL (ref 8.5–10.1)
CALCULATED P AXIS, ECG09: 34 DEGREES
CALCULATED R AXIS, ECG10: 25 DEGREES
CALCULATED T AXIS, ECG11: 27 DEGREES
CHLORIDE SERPL-SCNC: 106 MMOL/L (ref 100–111)
CO2 SERPL-SCNC: 33 MMOL/L (ref 21–32)
CREAT SERPL-MCNC: 1.11 MG/DL (ref 0.6–1.3)
DIAGNOSIS, 93000: NORMAL
DIFFERENTIAL METHOD BLD: ABNORMAL
EOSINOPHIL # BLD: 0.1 K/UL (ref 0–0.4)
EOSINOPHIL NFR BLD: 2 % (ref 0–5)
ERYTHROCYTE [DISTWIDTH] IN BLOOD BY AUTOMATED COUNT: 12.8 % (ref 11.6–14.5)
GLOBULIN SER CALC-MCNC: 3.7 G/DL (ref 2–4)
GLUCOSE SERPL-MCNC: 95 MG/DL (ref 74–99)
HCT VFR BLD AUTO: 45 % (ref 36–48)
HGB BLD-MCNC: 14.5 G/DL (ref 13–16)
IMM GRANULOCYTES # BLD AUTO: 0 K/UL (ref 0–0.04)
IMM GRANULOCYTES NFR BLD AUTO: 0 % (ref 0–0.5)
LIPASE SERPL-CCNC: 68 U/L (ref 73–393)
LYMPHOCYTES # BLD: 1.4 K/UL (ref 0.9–3.6)
LYMPHOCYTES NFR BLD: 29 % (ref 21–52)
MAGNESIUM SERPL-MCNC: 2.4 MG/DL (ref 1.6–2.6)
MCH RBC QN AUTO: 33.2 PG (ref 24–34)
MCHC RBC AUTO-ENTMCNC: 32.2 G/DL (ref 31–37)
MCV RBC AUTO: 103 FL (ref 78–100)
MONOCYTES # BLD: 0.3 K/UL (ref 0.05–1.2)
MONOCYTES NFR BLD: 7 % (ref 3–10)
NEUTS SEG # BLD: 2.9 K/UL (ref 1.8–8)
NEUTS SEG NFR BLD: 61 % (ref 40–73)
NRBC # BLD: 0 K/UL (ref 0–0.01)
NRBC BLD-RTO: 0 PER 100 WBC
P-R INTERVAL, ECG05: 138 MS
PLATELET # BLD AUTO: 204 K/UL (ref 135–420)
PMV BLD AUTO: 10.2 FL (ref 9.2–11.8)
POTASSIUM SERPL-SCNC: 4.3 MMOL/L (ref 3.5–5.5)
PROT SERPL-MCNC: 7.3 G/DL (ref 6.4–8.2)
Q-T INTERVAL, ECG07: 360 MS
QRS DURATION, ECG06: 88 MS
QTC CALCULATION (BEZET), ECG08: 396 MS
RBC # BLD AUTO: 4.37 M/UL (ref 4.35–5.65)
SODIUM SERPL-SCNC: 141 MMOL/L (ref 136–145)
TROPONIN-HIGH SENSITIVITY: 5 NG/L (ref 0–78)
VENTRICULAR RATE, ECG03: 73 BPM
WBC # BLD AUTO: 4.8 K/UL (ref 4.6–13.2)

## 2021-12-19 PROCEDURE — 96361 HYDRATE IV INFUSION ADD-ON: CPT

## 2021-12-19 PROCEDURE — 71045 X-RAY EXAM CHEST 1 VIEW: CPT

## 2021-12-19 PROCEDURE — 93005 ELECTROCARDIOGRAM TRACING: CPT

## 2021-12-19 PROCEDURE — 96374 THER/PROPH/DIAG INJ IV PUSH: CPT

## 2021-12-19 PROCEDURE — 85025 COMPLETE CBC W/AUTO DIFF WBC: CPT

## 2021-12-19 PROCEDURE — 83735 ASSAY OF MAGNESIUM: CPT

## 2021-12-19 PROCEDURE — 80053 COMPREHEN METABOLIC PANEL: CPT

## 2021-12-19 PROCEDURE — 74011250636 HC RX REV CODE- 250/636: Performed by: EMERGENCY MEDICINE

## 2021-12-19 PROCEDURE — 83690 ASSAY OF LIPASE: CPT

## 2021-12-19 PROCEDURE — 84484 ASSAY OF TROPONIN QUANT: CPT

## 2021-12-19 PROCEDURE — 99283 EMERGENCY DEPT VISIT LOW MDM: CPT

## 2021-12-19 RX ORDER — ONDANSETRON 2 MG/ML
4 INJECTION INTRAMUSCULAR; INTRAVENOUS ONCE
Status: COMPLETED | OUTPATIENT
Start: 2021-12-19 | End: 2021-12-19

## 2021-12-19 RX ORDER — CLONAZEPAM 0.5 MG/1
TABLET ORAL
COMMUNITY

## 2021-12-19 RX ORDER — ONDANSETRON 4 MG/1
4 TABLET, FILM COATED ORAL
Qty: 14 TABLET | Refills: 0 | Status: SHIPPED | OUTPATIENT
Start: 2021-12-19

## 2021-12-19 RX ADMIN — SODIUM CHLORIDE 1000 ML: 9 INJECTION, SOLUTION INTRAVENOUS at 12:52

## 2021-12-19 RX ADMIN — ONDANSETRON 4 MG: 2 INJECTION INTRAMUSCULAR; INTRAVENOUS at 12:52

## 2021-12-19 NOTE — ED NOTES
2:18 PM  12/19/21     Discharge instructions given to Yane Elijah (name) with verbalization of understanding. Patient accompanied by self. Patient discharged with the following prescriptions Zofran. Patient discharged to home (destination).       Danny Monterroso RN

## 2021-12-19 NOTE — DISCHARGE INSTRUCTIONS
Drink plenty of water. Return to the ED for worsening symptoms, inability to tolerate oral medications, or further concerns.

## 2021-12-19 NOTE — LETTER
NOTIFICATION RETURN TO WORK / SCHOOL    12/19/2021 2:13 PM    Mr. Tucker Cardoso  [address] [id number]      To Whom It May Concern:    Tucker Cardoso is currently under the care of [facility] EMERGENCY DEPT. He will return to work/school on: 12/21/2021    Tucker Cardoso may return to work/school with the following restrictions: n/a. If there are questions or concerns please have the patient contact our office.         Sincerely,      Britta Lew RN

## 2021-12-19 NOTE — ED TRIAGE NOTES
Ambulatory patient arrives with complaints of weakness for a couple days, cough, congestion, vomiting.

## 2021-12-19 NOTE — ED NOTES
Pt ambulated to in-room restroom with steady gait.  Given a specimen cup to provide a sample in the event the provider orders a urine test.

## 2021-12-19 NOTE — ED PROVIDER NOTES
EMERGENCY DEPARTMENT HISTORY AND PHYSICAL EXAM    Date: 12/19/2021  Patient Name: Tucker Cardoso    History of Presenting Illness     Chief Complaint   Patient presents with    Fatigue       History Provided By: Patient     History Gabriela Marking):   12:01 PM  Tucker Cardoso is a 35 y.o. male with no contributory stable your PMHX who presents to the emergency department C/O fatigue onset 1 week. Associated sxs include cough, nausea, vomiting, abdominal pain. Pt denies fever, chills or any other sxs or complaints. Patient states he recovered from Covid approximately 3 months ago. Patient states that he has felt more fatigue over the last week with his additional symptoms. Chief Complaint: Fatigue  Onset: 1 week  Timing:  acute  Context: Symptoms started spontaneously, symptoms have progressively worsened since onset  Location: Generalized  Quality: Sharp and Tired  Severity: Moderate  Modifying Factors: Nothing makes it better, or worse. Associated Symptoms: Nausea, vomiting, abdominal pain, cough    PCP: Melanie Gabriel MD     Current Outpatient Medications   Medication Sig Dispense Refill    clonazePAM (KlonoPIN) 0.5 mg tablet Take  by mouth nightly as needed for Anxiety.  ondansetron hcl (Zofran) 4 mg tablet Take 1 Tablet by mouth every eight (8) hours as needed for Nausea. 14 Tablet 0       Past History         Past Medical History:  Past Medical History:   Diagnosis Date    Arthritis        Past Surgical History:  History reviewed. No pertinent surgical history.     Family History:  Family History   Problem Relation Age of Onset    Cancer Mother    Reviewed and non-contributory    Social History:  Social History     Tobacco Use    Smoking status: Current Every Day Smoker     Packs/day: 0.25     Years: 15.00     Pack years: 3.75    Smokeless tobacco: Never Used   Substance Use Topics    Alcohol use: Yes     Comment: social    Drug use: No     Comment: use to smoke marijuana years ago Allergies:  No Known Allergies      Review of Systems      Review of Systems   Constitutional: Negative for chills and fever. HENT: Negative for rhinorrhea and sore throat. Eyes: Negative for pain and visual disturbance. Respiratory: Positive for cough. Negative for chest tightness, shortness of breath and wheezing. Cardiovascular: Negative for chest pain and palpitations. Gastrointestinal: Positive for abdominal pain, nausea and vomiting. Negative for diarrhea. Musculoskeletal: Negative for arthralgias and myalgias. Skin: Negative for rash and wound. Neurological: Negative for speech difficulty, light-headedness and headaches. Psychiatric/Behavioral: Negative for agitation and confusion. All other systems reviewed and are negative. Physical Exam     Vitals:    12/19/21 1114   BP: 135/85   Pulse: 94   Resp: 17   Temp: 97.1 °F (36.2 °C)   SpO2: 100%   Weight: 106.6 kg (235 lb)   Height: 6' 2\" (1.88 m)       Physical Exam  Vitals and nursing note reviewed. Constitutional:       General: He is not in acute distress. Appearance: Normal appearance. He is normal weight. He is not ill-appearing. HENT:      Head: Normocephalic and atraumatic. Nose: Nose normal. No rhinorrhea. Mouth/Throat:      Mouth: Mucous membranes are moist.      Pharynx: No oropharyngeal exudate or posterior oropharyngeal erythema. Eyes:      Extraocular Movements: Extraocular movements intact. Conjunctiva/sclera: Conjunctivae normal.      Pupils: Pupils are equal, round, and reactive to light. Cardiovascular:      Rate and Rhythm: Normal rate and regular rhythm. Heart sounds: No murmur heard. No friction rub. No gallop. Pulmonary:      Effort: Pulmonary effort is normal. No respiratory distress. Breath sounds: Normal breath sounds. No wheezing, rhonchi or rales. Abdominal:      General: Bowel sounds are normal.      Palpations: Abdomen is soft. Tenderness:  There is generalized abdominal tenderness. There is no guarding or rebound. Musculoskeletal:         General: No swelling, tenderness or deformity. Normal range of motion. Cervical back: Normal range of motion and neck supple. No rigidity. Lymphadenopathy:      Cervical: No cervical adenopathy. Skin:     General: Skin is warm and dry. Findings: No rash. Neurological:      General: No focal deficit present. Mental Status: He is alert and oriented to person, place, and time. Psychiatric:         Mood and Affect: Mood normal.         Behavior: Behavior normal.         Diagnostic Study Results     Labs -     Recent Results (from the past 12 hour(s))   CBC WITH AUTOMATED DIFF    Collection Time: 12/19/21 12:41 PM   Result Value Ref Range    WBC 4.8 4.6 - 13.2 K/uL    RBC 4.37 4.35 - 5.65 M/uL    HGB 14.5 13.0 - 16.0 g/dL    HCT 45.0 36.0 - 48.0 %    .0 (H) 78.0 - 100.0 FL    MCH 33.2 24.0 - 34.0 PG    MCHC 32.2 31.0 - 37.0 g/dL    RDW 12.8 11.6 - 14.5 %    PLATELET 530 036 - 256 K/uL    MPV 10.2 9.2 - 11.8 FL    NRBC 0.0 0  WBC    ABSOLUTE NRBC 0.00 0.00 - 0.01 K/uL    NEUTROPHILS 61 40 - 73 %    LYMPHOCYTES 29 21 - 52 %    MONOCYTES 7 3 - 10 %    EOSINOPHILS 2 0 - 5 %    BASOPHILS 0 0 - 2 %    IMMATURE GRANULOCYTES 0 0.0 - 0.5 %    ABS. NEUTROPHILS 2.9 1.8 - 8.0 K/UL    ABS. LYMPHOCYTES 1.4 0.9 - 3.6 K/UL    ABS. MONOCYTES 0.3 0.05 - 1.2 K/UL    ABS. EOSINOPHILS 0.1 0.0 - 0.4 K/UL    ABS. BASOPHILS 0.0 0.0 - 0.1 K/UL    ABS. IMM.  GRANS. 0.0 0.00 - 0.04 K/UL    DF AUTOMATED     METABOLIC PANEL, COMPREHENSIVE    Collection Time: 12/19/21 12:41 PM   Result Value Ref Range    Sodium 141 136 - 145 mmol/L    Potassium 4.3 3.5 - 5.5 mmol/L    Chloride 106 100 - 111 mmol/L    CO2 33 (H) 21 - 32 mmol/L    Anion gap 2 (L) 3.0 - 18 mmol/L    Glucose 95 74 - 99 mg/dL    BUN 8 7.0 - 18 MG/DL    Creatinine 1.11 0.6 - 1.3 MG/DL    BUN/Creatinine ratio 7 (L) 12 - 20      GFR est AA >60 >60 ml/min/1.73m2 GFR est non-AA >60 >60 ml/min/1.73m2    Calcium 9.7 8.5 - 10.1 MG/DL    Bilirubin, total 0.9 0.2 - 1.0 MG/DL    ALT (SGPT) 28 16 - 61 U/L    AST (SGOT) 13 10 - 38 U/L    Alk. phosphatase 67 45 - 117 U/L    Protein, total 7.3 6.4 - 8.2 g/dL    Albumin 3.6 3.4 - 5.0 g/dL    Globulin 3.7 2.0 - 4.0 g/dL    A-G Ratio 1.0 0.8 - 1.7     LIPASE    Collection Time: 12/19/21 12:41 PM   Result Value Ref Range    Lipase 68 (L) 73 - 393 U/L   TROPONIN-HIGH SENSITIVITY    Collection Time: 12/19/21 12:41 PM   Result Value Ref Range    Troponin-High Sensitivity 5 0 - 78 ng/L   MAGNESIUM    Collection Time: 12/19/21 12:41 PM   Result Value Ref Range    Magnesium 2.4 1.6 - 2.6 mg/dL   EKG, 12 LEAD, INITIAL    Collection Time: 12/19/21 12:43 PM   Result Value Ref Range    Ventricular Rate 73 BPM    Atrial Rate 73 BPM    P-R Interval 138 ms    QRS Duration 88 ms    Q-T Interval 360 ms    QTC Calculation (Bezet) 396 ms    Calculated P Axis 34 degrees    Calculated R Axis 25 degrees    Calculated T Axis 27 degrees    Diagnosis       Normal sinus rhythm  Normal ECG  No previous ECGs available         Radiologic Studies -   XR CHEST PORT   Final Result      No active cardiopulmonary disease. CT Results  (Last 48 hours)    None        CXR Results  (Last 48 hours)               12/19/21 1236  XR CHEST PORT Final result    Impression:      No active cardiopulmonary disease. Narrative:  EXAM: CHEST RADIOGRAPH, SINGLE VIEW       CLINICAL INDICATION/HISTORY: cough        <Additional:  None. COMPARISON: None. TECHNIQUE: Portable frontal view of the chest was obtained.        _______________       FINDINGS:       SUPPORT DEVICES: None. HEART AND MEDIASTINUM: Cardiomediastinal silhouette appears within normal   limits. LUNGS AND PLEURAL SPACES: Pulmonary vessels are normal.  Lungs are clear. Costophrenic angles are sharp. No pneumothorax. BONY THORAX AND SOFT TISSUES: No acute osseous abnormality. _______________                 Medications given in the ED-  Medications   sodium chloride 0.9 % bolus infusion 1,000 mL (1,000 mL IntraVENous New Bag 12/19/21 1252)   ondansetron (ZOFRAN) injection 4 mg (4 mg IntraVENous Given 12/19/21 1252)         Medical Decision Making   I am the first provider for this patient. I reviewed the vital signs, available nursing notes, past medical history, past surgical history, family history and social history. Vital Signs-Reviewed the patient's vital signs. Records Reviewed: Nursing Notes    Pulse Oximetry Analysis - 100% on RA     Cardiac Monitor:  Rate: 94 bpm  Rhythm: sinus rhythm    EKG interpretation: (Preliminary)  Rhythm: NSR. Rate: 73 bpm; no STEMI  EKG read by Jasmeet Reed MD at 12:43 PM    Provider Notes (Medical Decision Making):   DDX: Viral syndrome, appendicitis, cholecystitis, pancreatitis, dehydration, fatigue, unlikely ACS    Procedures:  Procedures    ED Course:   12:01 PM Initial assessment performed. The patients presenting problems have been discussed, and they are in agreement with the care plan formulated and outlined with them. I have encouraged them to ask questions as they arise throughout their visit. 1:55 PM patient still has some residual fatigue but is otherwise had a normal work-up will discharge home with home treatment. Diagnosis and Disposition     Discussion:  35 y.o. male with 2 weeks of fatigue. Patient had a negative cardiac work-up in the ED. Patient had unremarkable labs and normal chest x-ray. He was rehydrated with IV normal saline and had his nausea controlled with Zofran. We will continue on Zofran at home and recommended rest and plenty of fluids. Patient may follow-up with his primary care doctor. Patient understands agrees with this plan. DISCHARGE NOTE:  2:01 PM   Baltazar Rodriguez Bibi's  results have been reviewed with him. He has been counseled regarding his diagnosis, treatment, and plan.   He verbally conveys understanding and agreement of the signs, symptoms, diagnosis, treatment and prognosis and additionally agrees to follow up as discussed. He also agrees with the care-plan and conveys that all of his questions have been answered. I have also provided discharge instructions for him that include: educational information regarding their diagnosis and treatment, and list of reasons why they would want to return to the ED prior to their follow-up appointment, should his condition change. He has been provided with education for proper emergency department utilization. CLINICAL IMPRESSION:    1. Fatigue, unspecified type    2. Non-intractable vomiting with nausea, unspecified vomiting type    3. Viral syndrome        PLAN:  1. D/C Home  2. Current Discharge Medication List      START taking these medications    Details   ondansetron hcl (Zofran) 4 mg tablet Take 1 Tablet by mouth every eight (8) hours as needed for Nausea. Qty: 14 Tablet, Refills: 0  Start date: 12/19/2021           3. Follow-up Information     Follow up With Specialties Details Why Contact Info    Jossy Siddiqui MD Family Medicine Schedule an appointment as soon as possible for a visit  As soon as possible, For follow up from Emergency Department visit. [address] [facility]  [phone]      [facility]  As needed; If symptoms worsen [address] [address] [address]     Please note that this dictation was completed with ipnexus, the computer voice recognition software. Quite often unanticipated grammatical, syntax, homophones, and other interpretive errors are inadvertently transcribed by the computer software. Please disregard these errors. Please excuse any errors that have escaped final proofreading.     Francisco Javier Mchugh MD

## 2022-07-13 ENCOUNTER — APPOINTMENT (OUTPATIENT)
Dept: CT IMAGING | Age: 42
End: 2022-07-13
Attending: EMERGENCY MEDICINE
Payer: MEDICAID

## 2022-07-13 ENCOUNTER — HOSPITAL ENCOUNTER (EMERGENCY)
Age: 42
Discharge: HOME | End: 2022-07-13
Attending: STUDENT IN AN ORGANIZED HEALTH CARE EDUCATION/TRAINING PROGRAM
Payer: MEDICAID

## 2022-07-13 VITALS
HEIGHT: 74 IN | BODY MASS INDEX: 29.52 KG/M2 | TEMPERATURE: 97.1 F | SYSTOLIC BLOOD PRESSURE: 112 MMHG | HEART RATE: 78 BPM | RESPIRATION RATE: 20 BRPM | OXYGEN SATURATION: 99 % | DIASTOLIC BLOOD PRESSURE: 64 MMHG | WEIGHT: 230 LBS

## 2022-07-13 DIAGNOSIS — R10.31 RLQ ABDOMINAL PAIN: Primary | ICD-10-CM

## 2022-07-13 LAB
ALBUMIN SERPL-MCNC: 3.9 G/DL (ref 3.4–5)
ALBUMIN/GLOB SERPL: 1.1 {RATIO} (ref 0.8–1.7)
ALP SERPL-CCNC: 55 U/L (ref 45–117)
ALT SERPL-CCNC: 30 U/L (ref 16–61)
ANION GAP SERPL CALC-SCNC: 2 MMOL/L (ref 3–18)
APPEARANCE UR: CLEAR
AST SERPL-CCNC: 14 U/L (ref 10–38)
BASOPHILS # BLD: 0 K/UL (ref 0–0.1)
BASOPHILS NFR BLD: 1 % (ref 0–2)
BILIRUB SERPL-MCNC: 0.7 MG/DL (ref 0.2–1)
BILIRUB UR QL: NEGATIVE
BUN SERPL-MCNC: 10 MG/DL (ref 7–18)
BUN/CREAT SERPL: 9 (ref 12–20)
CALCIUM SERPL-MCNC: 9.2 MG/DL (ref 8.5–10.1)
CHLORIDE SERPL-SCNC: 106 MMOL/L (ref 100–111)
CO2 SERPL-SCNC: 31 MMOL/L (ref 21–32)
COLOR UR: YELLOW
CREAT SERPL-MCNC: 1.14 MG/DL (ref 0.6–1.3)
DIFFERENTIAL METHOD BLD: NORMAL
EOSINOPHIL # BLD: 0.2 K/UL (ref 0–0.4)
EOSINOPHIL NFR BLD: 5 % (ref 0–5)
ERYTHROCYTE [DISTWIDTH] IN BLOOD BY AUTOMATED COUNT: 12.1 % (ref 11.6–14.5)
GLOBULIN SER CALC-MCNC: 3.4 G/DL (ref 2–4)
GLUCOSE SERPL-MCNC: 85 MG/DL (ref 74–99)
GLUCOSE UR STRIP.AUTO-MCNC: NEGATIVE MG/DL
HCT VFR BLD AUTO: 45.4 % (ref 36–48)
HGB BLD-MCNC: 15 G/DL (ref 13–16)
HGB UR QL STRIP: NEGATIVE
IMM GRANULOCYTES # BLD AUTO: 0 K/UL (ref 0–0.04)
IMM GRANULOCYTES NFR BLD AUTO: 0 % (ref 0–0.5)
KETONES UR QL STRIP.AUTO: NEGATIVE MG/DL
LEUKOCYTE ESTERASE UR QL STRIP.AUTO: NEGATIVE
LIPASE SERPL-CCNC: 319 U/L (ref 73–393)
LYMPHOCYTES # BLD: 2 K/UL (ref 0.9–3.6)
LYMPHOCYTES NFR BLD: 40 % (ref 21–52)
MCH RBC QN AUTO: 32.8 PG (ref 24–34)
MCHC RBC AUTO-ENTMCNC: 33 G/DL (ref 31–37)
MCV RBC AUTO: 99.3 FL (ref 78–100)
MONOCYTES # BLD: 0.4 K/UL (ref 0.05–1.2)
MONOCYTES NFR BLD: 9 % (ref 3–10)
NEUTS SEG # BLD: 2.3 K/UL (ref 1.8–8)
NEUTS SEG NFR BLD: 46 % (ref 40–73)
NITRITE UR QL STRIP.AUTO: NEGATIVE
NRBC # BLD: 0 K/UL (ref 0–0.01)
NRBC BLD-RTO: 0 PER 100 WBC
PH UR STRIP: 6 [PH] (ref 5–8)
PLATELET # BLD AUTO: 207 K/UL (ref 135–420)
PMV BLD AUTO: 9.7 FL (ref 9.2–11.8)
POTASSIUM SERPL-SCNC: 4.6 MMOL/L (ref 3.5–5.5)
PROT SERPL-MCNC: 7.3 G/DL (ref 6.4–8.2)
PROT UR STRIP-MCNC: NEGATIVE MG/DL
RBC # BLD AUTO: 4.57 M/UL (ref 4.35–5.65)
SODIUM SERPL-SCNC: 139 MMOL/L (ref 136–145)
SP GR UR REFRACTOMETRY: 1.02 (ref 1–1.03)
UROBILINOGEN UR QL STRIP.AUTO: 1 EU/DL (ref 0.2–1)
WBC # BLD AUTO: 5 K/UL (ref 4.6–13.2)

## 2022-07-13 PROCEDURE — 81003 URINALYSIS AUTO W/O SCOPE: CPT

## 2022-07-13 PROCEDURE — 83690 ASSAY OF LIPASE: CPT

## 2022-07-13 PROCEDURE — 96375 TX/PRO/DX INJ NEW DRUG ADDON: CPT

## 2022-07-13 PROCEDURE — 85025 COMPLETE CBC W/AUTO DIFF WBC: CPT

## 2022-07-13 PROCEDURE — 99284 EMERGENCY DEPT VISIT MOD MDM: CPT

## 2022-07-13 PROCEDURE — 96374 THER/PROPH/DIAG INJ IV PUSH: CPT

## 2022-07-13 PROCEDURE — 80053 COMPREHEN METABOLIC PANEL: CPT

## 2022-07-13 PROCEDURE — 74011250636 HC RX REV CODE- 250/636: Performed by: EMERGENCY MEDICINE

## 2022-07-13 PROCEDURE — 74176 CT ABD & PELVIS W/O CONTRAST: CPT

## 2022-07-13 RX ORDER — MORPHINE SULFATE 4 MG/ML
4 INJECTION INTRAVENOUS
Status: COMPLETED | OUTPATIENT
Start: 2022-07-13 | End: 2022-07-13

## 2022-07-13 RX ORDER — OXYCODONE AND ACETAMINOPHEN 5; 325 MG/1; MG/1
1 TABLET ORAL
Qty: 3 TABLET | Refills: 0 | Status: SHIPPED | OUTPATIENT
Start: 2022-07-13 | End: 2022-07-14

## 2022-07-13 RX ORDER — ONDANSETRON 2 MG/ML
4 INJECTION INTRAMUSCULAR; INTRAVENOUS
Status: COMPLETED | OUTPATIENT
Start: 2022-07-13 | End: 2022-07-13

## 2022-07-13 RX ORDER — ONDANSETRON 4 MG/1
8 TABLET, FILM COATED ORAL
Qty: 12 TABLET | Refills: 0 | Status: SHIPPED | OUTPATIENT
Start: 2022-07-13

## 2022-07-13 RX ADMIN — ONDANSETRON 4 MG: 2 INJECTION, SOLUTION INTRAMUSCULAR; INTRAVENOUS at 20:41

## 2022-07-13 RX ADMIN — MORPHINE SULFATE 4 MG: 4 INJECTION INTRAVENOUS at 20:41

## 2022-07-13 RX ADMIN — SODIUM CHLORIDE 1000 ML: 9 INJECTION, SOLUTION INTRAVENOUS at 20:42

## 2022-07-13 NOTE — ED TRIAGE NOTES
Pt arrives to ER w c/o abdominal pain x 6 days. Pt reports it has worsened today, attempted to eat, felt a burning sensation and vomited. Generalized aches as well x 6 days. Denies sick contacts. LBM \"little bit\" on Monday.

## 2022-07-13 NOTE — Clinical Note
[facility] [place]  [facility] EMERGENCY DEPT  [address]  [facility] NEWS [address] [facility] [address] [phone]    Work/School Note    Date: 7/13/2022    To Whom It May concern:    Nina Lambert was seen and treated today in the emergency room by the following provider(s):  Attending Provider: Kraig Drake MD  Physician Assistant: Debra Phillips is excused from work/school on 7/13/2022 through 7/15/2022. He is medically clear to return to work/school on 7/16/2022.          Sincerely,          TRACIE Garcia

## 2022-07-13 NOTE — ED PROVIDER NOTES
EMERGENCY DEPARTMENT HISTORY AND PHYSICAL EXAM    Date: 7/13/2022  Patient Name: Mi Berry    History of Presenting Illness     Chief Complaint   Patient presents with    Abdominal Pain         History Provided By: Patient    Additional History (Context): Mi Berry is a 35 y.o. male with No significant past medical history who presents with complaint of decreased appetite and abdominal pain for the past couple of days. Feels very nauseous and has early satiety. Denies prior abdominal surgeries. His pain is localized in his right lower quadrant. Denies fever. Patient smokes but has reduced his consumption from 1 pack every 3 days to approximately 2 to 3 cigarettes a day. He probably drinks most days 2-3 alcoholic beverages. He denies any illicits. PCP: Kuldeep Calles MD    Current Outpatient Medications   Medication Sig Dispense Refill    oxyCODONE-acetaminophen (Percocet) 5-325 mg per tablet Take 1 Tablet by mouth every eight (8) hours as needed for Pain for up to 1 day. Max Daily Amount: 3 Tablets. 3 Tablet 0    ondansetron hcl (Zofran) 4 mg tablet Take 2 Tablets by mouth every eight (8) hours as needed for Nausea. 12 Tablet 0    clonazePAM (KlonoPIN) 0.5 mg tablet Take  by mouth nightly as needed for Anxiety.  ondansetron hcl (Zofran) 4 mg tablet Take 1 Tablet by mouth every eight (8) hours as needed for Nausea. 14 Tablet 0       Past History     Past Medical History:  Past Medical History:   Diagnosis Date    Arthritis        Past Surgical History:  History reviewed. No pertinent surgical history.     Family History:  Family History   Problem Relation Age of Onset    Cancer Mother        Social History:  Social History     Tobacco Use    Smoking status: Current Every Day Smoker     Packs/day: 0.25     Years: 15.00     Pack years: 3.75    Smokeless tobacco: Never Used   Substance Use Topics    Alcohol use: Yes     Comment: social    Drug use: No     Comment: use to smoke marijuana years ago       Allergies:  No Known Allergies      Review of Systems   Review of Systems   Constitutional: Negative for fever. HENT: Negative. Eyes: Negative. Respiratory: Negative. Cardiovascular: Negative. Gastrointestinal: Positive for abdominal pain and nausea. Negative for diarrhea. Endocrine: Negative. Genitourinary: Negative. Musculoskeletal: Negative. Skin: Negative. Allergic/Immunologic: Negative. Neurological: Negative. Hematological: Negative. Psychiatric/Behavioral: Negative. All Other Systems Negative  Physical Exam     Vitals:    07/13/22 1731   Pulse: 87   Resp: 16   Temp: 97 °F (36.1 °C)   SpO2: 99%   Weight: 104.3 kg (230 lb)   Height: 6' 2\" (1.88 m)     Physical Exam  Vitals and nursing note reviewed. Constitutional:       General: He is not in acute distress. Appearance: He is well-developed. He is not ill-appearing, toxic-appearing or diaphoretic. HENT:      Head: Normocephalic and atraumatic. Neck:      Thyroid: No thyromegaly. Vascular: No carotid bruit. Trachea: No tracheal deviation. Cardiovascular:      Rate and Rhythm: Normal rate and regular rhythm. Heart sounds: Normal heart sounds. No murmur heard. No friction rub. No gallop. Pulmonary:      Effort: Pulmonary effort is normal. No respiratory distress. Breath sounds: Normal breath sounds. No stridor. No wheezing or rales. Chest:      Chest wall: No tenderness. Abdominal:      General: There is no distension. Palpations: Abdomen is soft. There is no mass. Tenderness: There is abdominal tenderness in the right lower quadrant. There is no guarding or rebound. Musculoskeletal:         General: Normal range of motion. Cervical back: Normal range of motion and neck supple. Skin:     General: Skin is warm and dry. Coloration: Skin is not pale. Neurological:      Mental Status: He is alert.    Psychiatric:         Speech: Speech normal.         Behavior: Behavior normal.         Thought Content: Thought content normal.         Judgment: Judgment normal.            Diagnostic Study Results     Labs -     Recent Results (from the past 12 hour(s))   CBC WITH AUTOMATED DIFF    Collection Time: 07/13/22  6:45 PM   Result Value Ref Range    WBC 5.0 4.6 - 13.2 K/uL    RBC 4.57 4.35 - 5.65 M/uL    HGB 15.0 13.0 - 16.0 g/dL    HCT 45.4 36.0 - 48.0 %    MCV 99.3 78.0 - 100.0 FL    MCH 32.8 24.0 - 34.0 PG    MCHC 33.0 31.0 - 37.0 g/dL    RDW 12.1 11.6 - 14.5 %    PLATELET 247 005 - 627 K/uL    MPV 9.7 9.2 - 11.8 FL    NRBC 0.0 0  WBC    ABSOLUTE NRBC 0.00 0.00 - 0.01 K/uL    NEUTROPHILS 46 40 - 73 %    LYMPHOCYTES 40 21 - 52 %    MONOCYTES 9 3 - 10 %    EOSINOPHILS 5 0 - 5 %    BASOPHILS 1 0 - 2 %    IMMATURE GRANULOCYTES 0 0.0 - 0.5 %    ABS. NEUTROPHILS 2.3 1.8 - 8.0 K/UL    ABS. LYMPHOCYTES 2.0 0.9 - 3.6 K/UL    ABS. MONOCYTES 0.4 0.05 - 1.2 K/UL    ABS. EOSINOPHILS 0.2 0.0 - 0.4 K/UL    ABS. BASOPHILS 0.0 0.0 - 0.1 K/UL    ABS. IMM. GRANS. 0.0 0.00 - 0.04 K/UL    DF AUTOMATED     METABOLIC PANEL, COMPREHENSIVE    Collection Time: 07/13/22  6:45 PM   Result Value Ref Range    Sodium 139 136 - 145 mmol/L    Potassium 4.6 3.5 - 5.5 mmol/L    Chloride 106 100 - 111 mmol/L    CO2 31 21 - 32 mmol/L    Anion gap 2 (L) 3.0 - 18 mmol/L    Glucose 85 74 - 99 mg/dL    BUN 10 7.0 - 18 MG/DL    Creatinine 1.14 0.6 - 1.3 MG/DL    BUN/Creatinine ratio 9 (L) 12 - 20      GFR est AA >60 >60 ml/min/1.73m2    GFR est non-AA >60 >60 ml/min/1.73m2    Calcium 9.2 8.5 - 10.1 MG/DL    Bilirubin, total 0.7 0.2 - 1.0 MG/DL    ALT (SGPT) 30 16 - 61 U/L    AST (SGOT) 14 10 - 38 U/L    Alk.  phosphatase 55 45 - 117 U/L    Protein, total 7.3 6.4 - 8.2 g/dL    Albumin 3.9 3.4 - 5.0 g/dL    Globulin 3.4 2.0 - 4.0 g/dL    A-G Ratio 1.1 0.8 - 1.7     LIPASE    Collection Time: 07/13/22  6:45 PM   Result Value Ref Range    Lipase 319 73 - 393 U/L   URINALYSIS W/ RFLX MICROSCOPIC    Collection Time: 07/13/22  7:55 PM   Result Value Ref Range    Color YELLOW      Appearance CLEAR      Specific gravity 1.018 1.005 - 1.030      pH (UA) 6.0 5.0 - 8.0      Protein Negative NEG mg/dL    Glucose Negative NEG mg/dL    Ketone Negative NEG mg/dL    Bilirubin Negative NEG      Blood Negative NEG      Urobilinogen 1.0 0.2 - 1.0 EU/dL    Nitrites Negative NEG      Leukocyte Esterase Negative NEG         Radiologic Studies -   CT ABD PELV WO CONT   Final Result      No acute findings. Chronic ancillary findings, as described. _______________                          CT Results  (Last 48 hours)               07/13/22 1956  CT ABD PELV WO CONT Final result    Impression:      No acute findings. Chronic ancillary findings, as described. _______________                               Narrative:  EXAM: CT of the abdomen and pelvis without intravenous contrast.       INDICATION:  \"RLQ abd pain. \"       COMPARISON:  No relevant prior imaging is available for comparison at the time   of dictation. DOSE REDUCTION AND DICOM INFORMATION: One or more dose reduction techniques were   used on this CT: automated exposure control, adjustment of the mAs and/or kVp   according to patient size, and iterative reconstruction techniques. The   specific techniques used on this CT exam have been documented in the patient's   electronic medical record. Digital Imaging and Communications in Medicine   (DICOM) format image data are available to nonaffiliated external healthcare   facilities or entities on a secure, media free, reciprocally searchable basis   with patient authorization for at least a 12-month period after this study. _______________       FINDINGS:            IMAGE QUALITY:  Imaging of the solid organs, vasculature, and the bowel is   intrinsically limited by noncontrast technique. CHEST BASE: Unremarkable. LIVER: Unremarkable. GALLBLADDER: Unremarkable. BILE DUCTS: Unremarkable. PANCREAS: Unremarkable. SPLEEN: Unremarkable. GASTROINTESTINAL TRACT AND PERITONEAL CAVITY:                 Lower esophagus:  Patulous. Hiatal hernia:  None significant. Diverticulosis:  None significant. Ascites:  No.             Bowel obstruction:  No.             Pneumoperitoneum:  No.             Appendix/region: The appendix is well imaged and normal.            ADRENALS:                  Right:  Unremarkable. Left:  Unremarkable. KIDNEYS, URETERS, BLADDER:                 Nonobstructive calculi:  None detected. Right kidney/ureter:  Unremarkable. Left kidney/ureter:  Unremarkable. Bladder:  Unremarkable. VASCULATURE: Unremarkable. LYMPH NODES: Unremarkable. REPRODUCTIVE ORGANS: Unremarkable. SUPERFICIAL SOFT TISSUES: Unremarkable. BONES:  Transitional lumbosacral anatomy. _______________               CXR Results  (Last 48 hours)    None            Medical Decision Making   I am the first provider for this patient. I reviewed the vital signs, available nursing notes, past medical history, past surgical history, family history and social history. Vital Signs-Reviewed the patient's vital signs. Records Reviewed: Nursing Notes    Procedures:  Procedures    Provider Notes (Medical Decision Making): Patient with right lower quadrant pain abdominal discomfort nausea vomiting but CT scan good so no acute appendicitis. No colitis. No urinary tract infection. Will advise patient to return for any worsening of his symptoms that it could be still an acute early appendicitis. No leukocytosis. MED RECONCILIATION:  No current facility-administered medications for this encounter.      Current Outpatient Medications   Medication Sig    oxyCODONE-acetaminophen (Percocet) 5-325 mg per tablet Take 1 Tablet by mouth every eight (8) hours as needed for Pain for up to 1 day. Max Daily Amount: 3 Tablets.  ondansetron hcl (Zofran) 4 mg tablet Take 2 Tablets by mouth every eight (8) hours as needed for Nausea.  clonazePAM (KlonoPIN) 0.5 mg tablet Take  by mouth nightly as needed for Anxiety.  ondansetron hcl (Zofran) 4 mg tablet Take 1 Tablet by mouth every eight (8) hours as needed for Nausea. Disposition:  home    DISCHARGE NOTE:   8:57 PM    Pt has been reexamined. Patient has no new complaints, changes, or physical findings. Care plan outlined and precautions discussed. Results of labs, CT were reviewed with the patient. All medications were reviewed with the patient; will d/c home with zofran, percocet. All of pt's questions and concerns were addressed. Patient was instructed and agrees to follow up with PCP, as well as to return to the ED upon further deterioration. Patient is ready to go home. Follow-up Information     Follow up With Specialties Details Why Contact Info    Sohail Tompkins MD Family Medicine Schedule an appointment as soon as possible for a visit in 2 days  [address] [facility] [place] [address]      THE [facility] EMERGENCY DEPT Emergency Medicine  If symptoms worsen return immediately [address]  [phone]          Current Discharge Medication List      START taking these medications    Details   oxyCODONE-acetaminophen (Percocet) 5-325 mg per tablet Take 1 Tablet by mouth every eight (8) hours as needed for Pain for up to 1 day. Max Daily Amount: 3 Tablets. Qty: 3 Tablet, Refills: 0  Start date: 7/13/2022, End date: 7/14/2022    Associated Diagnoses: RLQ abdominal pain      !! ondansetron hcl (Zofran) 4 mg tablet Take 2 Tablets by mouth every eight (8) hours as needed for Nausea.   Qty: 12 Tablet, Refills: 0  Start date: 7/13/2022       !! - Potential duplicate medications found. Please discuss with provider. CONTINUE these medications which have NOT CHANGED    Details   !! ondansetron hcl (Zofran) 4 mg tablet Take 1 Tablet by mouth every eight (8) hours as needed for Nausea. Qty: 14 Tablet, Refills: 0       !! - Potential duplicate medications found. Please discuss with provider. Diagnosis     Clinical Impression:   1.  RLQ abdominal pain

## 2022-07-13 NOTE — Clinical Note
[facility] [place]  THE [facility] EMERGENCY DEPT  [address] [facility] [id number] [phone]    Work/School Note    Date: 7/13/2022    To Whom It May concern:    Keyla Pulido was seen and treated today in the emergency room by the following provider(s):  Attending Provider: Cristóbal Virgen MD  Physician Assistant: Rupa Pa is excused from work/school on 7/13/2022 through 7/15/2022. He is medically clear to return to work/school on 7/16/2022.          Sincerely,          Felisha Mcknight RN

## 2022-12-28 ENCOUNTER — HOSPITAL ENCOUNTER (EMERGENCY)
Age: 42
Discharge: HOME | End: 2022-12-28
Attending: EMERGENCY MEDICINE
Payer: MEDICAID

## 2022-12-28 ENCOUNTER — APPOINTMENT (OUTPATIENT)
Dept: CT IMAGING | Age: 42
End: 2022-12-28
Attending: EMERGENCY MEDICINE
Payer: MEDICAID

## 2022-12-28 ENCOUNTER — APPOINTMENT (OUTPATIENT)
Dept: GENERAL RADIOLOGY | Age: 42
End: 2022-12-28
Attending: EMERGENCY MEDICINE
Payer: MEDICAID

## 2022-12-28 VITALS
OXYGEN SATURATION: 99 % | HEART RATE: 95 BPM | TEMPERATURE: 97.3 F | DIASTOLIC BLOOD PRESSURE: 72 MMHG | RESPIRATION RATE: 18 BRPM | SYSTOLIC BLOOD PRESSURE: 130 MMHG

## 2022-12-28 DIAGNOSIS — K21.00 GASTROESOPHAGEAL REFLUX DISEASE WITH ESOPHAGITIS WITHOUT HEMORRHAGE: Primary | ICD-10-CM

## 2022-12-28 LAB
ALBUMIN SERPL-MCNC: 4 G/DL (ref 3.4–5)
ALBUMIN/GLOB SERPL: 1.2 {RATIO} (ref 0.8–1.7)
ALP SERPL-CCNC: 65 U/L (ref 45–117)
ALT SERPL-CCNC: 25 U/L (ref 16–61)
ANION GAP SERPL CALC-SCNC: 6 MMOL/L (ref 3–18)
AST SERPL-CCNC: 16 U/L (ref 10–38)
ATRIAL RATE: 99 BPM
BASOPHILS # BLD: 0 K/UL (ref 0–0.1)
BASOPHILS NFR BLD: 1 % (ref 0–2)
BILIRUB SERPL-MCNC: 0.4 MG/DL (ref 0.2–1)
BUN SERPL-MCNC: 13 MG/DL (ref 7–18)
BUN/CREAT SERPL: 13 (ref 12–20)
CALCIUM SERPL-MCNC: 9.6 MG/DL (ref 8.5–10.1)
CALCULATED P AXIS, ECG09: 29 DEGREES
CALCULATED R AXIS, ECG10: 27 DEGREES
CALCULATED T AXIS, ECG11: 22 DEGREES
CHLORIDE SERPL-SCNC: 105 MMOL/L (ref 100–111)
CO2 SERPL-SCNC: 29 MMOL/L (ref 21–32)
CREAT SERPL-MCNC: 1.03 MG/DL (ref 0.6–1.3)
DIAGNOSIS, 93000: NORMAL
DIFFERENTIAL METHOD BLD: ABNORMAL
EOSINOPHIL # BLD: 0.2 K/UL (ref 0–0.4)
EOSINOPHIL NFR BLD: 3 % (ref 0–5)
ERYTHROCYTE [DISTWIDTH] IN BLOOD BY AUTOMATED COUNT: 12.6 % (ref 11.6–14.5)
ETHANOL SERPL-MCNC: 46 MG/DL (ref 0–3)
FLUAV RNA SPEC QL NAA+PROBE: NOT DETECTED
FLUBV RNA SPEC QL NAA+PROBE: NOT DETECTED
GLOBULIN SER CALC-MCNC: 3.4 G/DL (ref 2–4)
GLUCOSE SERPL-MCNC: 98 MG/DL (ref 74–99)
HCT VFR BLD AUTO: 42.9 % (ref 36–48)
HGB BLD-MCNC: 14.1 G/DL (ref 13–16)
IMM GRANULOCYTES # BLD AUTO: 0 K/UL (ref 0–0.04)
IMM GRANULOCYTES NFR BLD AUTO: 0 % (ref 0–0.5)
LIPASE SERPL-CCNC: 184 U/L (ref 73–393)
LYMPHOCYTES # BLD: 1.8 K/UL (ref 0.9–3.6)
LYMPHOCYTES NFR BLD: 32 % (ref 21–52)
MAGNESIUM SERPL-MCNC: 2.2 MG/DL (ref 1.6–2.6)
MCH RBC QN AUTO: 32.6 PG (ref 24–34)
MCHC RBC AUTO-ENTMCNC: 32.9 G/DL (ref 31–37)
MCV RBC AUTO: 99.1 FL (ref 78–100)
MONOCYTES # BLD: 0.4 K/UL (ref 0.05–1.2)
MONOCYTES NFR BLD: 7 % (ref 3–10)
NEUTS SEG # BLD: 3.1 K/UL (ref 1.8–8)
NEUTS SEG NFR BLD: 57 % (ref 40–73)
NRBC # BLD: 0 K/UL (ref 0–0.01)
NRBC BLD-RTO: 0 PER 100 WBC
P-R INTERVAL, ECG05: 130 MS
PLATELET # BLD AUTO: 204 K/UL (ref 135–420)
PMV BLD AUTO: 10 FL (ref 9.2–11.8)
POTASSIUM SERPL-SCNC: 4.3 MMOL/L (ref 3.5–5.5)
PROT SERPL-MCNC: 7.4 G/DL (ref 6.4–8.2)
Q-T INTERVAL, ECG07: 322 MS
QRS DURATION, ECG06: 76 MS
QTC CALCULATION (BEZET), ECG08: 413 MS
RBC # BLD AUTO: 4.33 M/UL (ref 4.35–5.65)
SARS-COV-2, COV2: NOT DETECTED
SODIUM SERPL-SCNC: 140 MMOL/L (ref 136–145)
TROPONIN-HIGH SENSITIVITY: 5 NG/L (ref 0–78)
VENTRICULAR RATE, ECG03: 99 BPM
WBC # BLD AUTO: 5.5 K/UL (ref 4.6–13.2)

## 2022-12-28 PROCEDURE — 87636 SARSCOV2 & INF A&B AMP PRB: CPT

## 2022-12-28 PROCEDURE — 96374 THER/PROPH/DIAG INJ IV PUSH: CPT

## 2022-12-28 PROCEDURE — C9113 INJ PANTOPRAZOLE SODIUM, VIA: HCPCS | Performed by: EMERGENCY MEDICINE

## 2022-12-28 PROCEDURE — 85025 COMPLETE CBC W/AUTO DIFF WBC: CPT

## 2022-12-28 PROCEDURE — 82077 ASSAY SPEC XCP UR&BREATH IA: CPT

## 2022-12-28 PROCEDURE — 84484 ASSAY OF TROPONIN QUANT: CPT

## 2022-12-28 PROCEDURE — 74176 CT ABD & PELVIS W/O CONTRAST: CPT

## 2022-12-28 PROCEDURE — 99285 EMERGENCY DEPT VISIT HI MDM: CPT

## 2022-12-28 PROCEDURE — 74011250636 HC RX REV CODE- 250/636: Performed by: EMERGENCY MEDICINE

## 2022-12-28 PROCEDURE — 71046 X-RAY EXAM CHEST 2 VIEWS: CPT

## 2022-12-28 PROCEDURE — 74011250637 HC RX REV CODE- 250/637: Performed by: EMERGENCY MEDICINE

## 2022-12-28 PROCEDURE — 83735 ASSAY OF MAGNESIUM: CPT

## 2022-12-28 PROCEDURE — 83690 ASSAY OF LIPASE: CPT

## 2022-12-28 PROCEDURE — 74011000250 HC RX REV CODE- 250: Performed by: EMERGENCY MEDICINE

## 2022-12-28 PROCEDURE — 80053 COMPREHEN METABOLIC PANEL: CPT

## 2022-12-28 RX ORDER — PANTOPRAZOLE SODIUM 40 MG/1
40 TABLET, DELAYED RELEASE ORAL DAILY
Qty: 20 TABLET | Refills: 0 | Status: SHIPPED | OUTPATIENT
Start: 2022-12-28 | End: 2023-01-17

## 2022-12-28 RX ORDER — FLUOXETINE HYDROCHLORIDE 20 MG/1
40 CAPSULE ORAL DAILY
COMMUNITY
Start: 2022-09-09

## 2022-12-28 RX ORDER — LIDOCAINE HYDROCHLORIDE 20 MG/ML
15 SOLUTION OROPHARYNGEAL ONCE
Status: COMPLETED | OUTPATIENT
Start: 2022-12-28 | End: 2022-12-28

## 2022-12-28 RX ADMIN — PANTOPRAZOLE SODIUM 40 MG: 40 INJECTION, POWDER, FOR SOLUTION INTRAVENOUS at 18:53

## 2022-12-28 RX ADMIN — ALUMINUM HYDROXIDE AND MAGNESIUM HYDROXIDE 30 ML: 200; 200 SUSPENSION ORAL at 18:53

## 2022-12-28 RX ADMIN — LIDOCAINE HYDROCHLORIDE 15 ML: 20 SOLUTION ORAL at 18:53

## 2022-12-28 NOTE — ED TRIAGE NOTES
Pt arrives ambulatory to ED with c\o chest tightness x 3 weeks, pt sts today it worsened while at work, pt is able to make needsknown speaking in complete sentences, pt in nad at this time

## 2022-12-28 NOTE — ED PROVIDER NOTES
EMERGENCY DEPARTMENT HISTORY AND PHYSICAL EXAM    Date: 12/28/2022  Patient Name: Jose Manuel Cosby    History of Presenting Illness     Chief Complaint   Patient presents with    Chest Pain         History Provided By: Patient and girlfriend      Additional History (Context): Jose Manuel Cosby is a 38 y.o. male with hypertension, hyperlipidemia, and anxiety, ETOH abuse  who presents with complaint of fairly constant chest discomfort bilaterally as well as epigastric discomfort he says for about 2 to 3 days worsening. Became worse while at work today. Patient says when it becomes worse it just tends to linger and stay. When at rest is changed chest pain does not subside. Denies nausea vomiting melena hematochezia. Has a history of hypertension and hypercholesterolemia but is not on medications he said he is not on any medications because he never wanted to take them and its been years since he was told that he had those issues. He smokes 1 to 2 cigarettes a day and occasionally smokes marijuana but drinks at least 6 beers a day or 6 drinks of mixed spirits. Patient does not take daily aspirin is not followed by cardiology and has not had a stress test or echocardiogram.  He said his father had an MI at age 29. Patient says he also sometimes drinks in the morning to he lacks him before going to work. Patient says he is also had decreased appetite yesterday.     PCP: None    Current Facility-Administered Medications   Medication Dose Route Frequency Provider Last Rate Last Admin    pantoprazole (PROTONIX) 40 mg in 0.9% sodium chloride 10 mL injection  40 mg IntraVENous ONCE Norma Maurer PA        lidocaine (XYLOCAINE) 2 % viscous solution 15 mL  15 mL Mouth/Throat ONCE Norma Maurer PA        aluminum-magnesium hydroxide (MAALOX) oral suspension 30 mL  30 mL Oral NOW Norma Maurer PA         Current Outpatient Medications   Medication Sig Dispense Refill    FLUoxetine (PROzac) 20 mg capsule Take 40 mg by mouth daily.      pantoprazole (Protonix) 40 mg tablet Take 1 Tablet by mouth daily for 20 days. 20 Tablet 0    ondansetron hcl (Zofran) 4 mg tablet Take 2 Tablets by mouth every eight (8) hours as needed for Nausea. 12 Tablet 0    clonazePAM (KlonoPIN) 0.5 mg tablet Take  by mouth nightly as needed for Anxiety. ondansetron hcl (Zofran) 4 mg tablet Take 1 Tablet by mouth every eight (8) hours as needed for Nausea. 14 Tablet 0       Past History     Past Medical History:  Past Medical History:   Diagnosis Date    Anxiety     Arthritis        Past Surgical History:  History reviewed. No pertinent surgical history. Family History:  Family History   Problem Relation Age of Onset    Cancer Mother        Social History:  Social History     Tobacco Use    Smoking status: Every Day     Packs/day: 0.25     Years: 15.00     Pack years: 3.75     Types: Cigarettes    Smokeless tobacco: Never   Substance Use Topics    Alcohol use: Yes     Comment: social    Drug use: No     Comment: use to smoke marijuana years ago       Allergies:  No Known Allergies      Review of Systems   Review of Systems   Constitutional:  Positive for appetite change. Negative for fever. HENT: Negative. Eyes: Negative. Respiratory:  Negative for shortness of breath. Cardiovascular:  Positive for chest pain. Gastrointestinal:  Positive for abdominal pain. Negative for blood in stool, diarrhea, nausea and vomiting. Endocrine: Negative. Genitourinary: Negative. Musculoskeletal: Negative. Skin: Negative. Allergic/Immunologic: Negative. Neurological: Negative. Hematological: Negative. Psychiatric/Behavioral: Negative. All Other Systems Negative  Physical Exam     Vitals:    12/28/22 1520   BP: 130/72   Pulse: 95   Resp: 18   Temp: 97.3 °F (36.3 °C)   SpO2: 99%     Physical Exam  Vitals and nursing note reviewed. Constitutional:       General: He is not in acute distress. Appearance: He is well-developed.  He is not ill-appearing, toxic-appearing or diaphoretic. HENT:      Head: Normocephalic and atraumatic. Neck:      Thyroid: No thyromegaly. Vascular: No carotid bruit. Trachea: No tracheal deviation. Cardiovascular:      Rate and Rhythm: Normal rate and regular rhythm. Pulses: Normal pulses. Heart sounds: Normal heart sounds. No murmur heard. No friction rub. No gallop. Comments: Equal radial pulses. Pulmonary:      Effort: Pulmonary effort is normal. No respiratory distress. Breath sounds: Normal breath sounds. No stridor. No wheezing or rales. Chest:      Chest wall: No tenderness. Abdominal:      General: There is no distension. Palpations: Abdomen is soft. There is no mass. Tenderness: There is abdominal tenderness. There is no guarding or rebound. Musculoskeletal:         General: Normal range of motion. Cervical back: Normal range of motion and neck supple. Skin:     General: Skin is warm and dry. Coloration: Skin is not pale. Neurological:      Mental Status: He is alert and oriented to person, place, and time. Psychiatric:         Speech: Speech normal.         Behavior: Behavior normal.         Thought Content:  Thought content normal.         Judgment: Judgment normal.          Diagnostic Study Results     Labs -     Recent Results (from the past 12 hour(s))   EKG, 12 LEAD, INITIAL    Collection Time: 12/28/22  3:17 PM   Result Value Ref Range    Ventricular Rate 99 BPM    Atrial Rate 99 BPM    P-R Interval 130 ms    QRS Duration 76 ms    Q-T Interval 322 ms    QTC Calculation (Bezet) 413 ms    Calculated P Axis 29 degrees    Calculated R Axis 27 degrees    Calculated T Axis 22 degrees    Diagnosis       Normal sinus rhythm  Minimal voltage criteria for LVH, may be normal variant ( R in aVL )  Borderline ECG  When compared with ECG of 19-DEC-2021 12:43,  No significant change was found     CBC WITH AUTOMATED DIFF    Collection Time: 12/28/22 3:50 PM   Result Value Ref Range    WBC 5.5 4.6 - 13.2 K/uL    RBC 4.33 (L) 4.35 - 5.65 M/uL    HGB 14.1 13.0 - 16.0 g/dL    HCT 42.9 36.0 - 48.0 %    MCV 99.1 78.0 - 100.0 FL    MCH 32.6 24.0 - 34.0 PG    MCHC 32.9 31.0 - 37.0 g/dL    RDW 12.6 11.6 - 14.5 %    PLATELET 171 099 - 287 K/uL    MPV 10.0 9.2 - 11.8 FL    NRBC 0.0 0  WBC    ABSOLUTE NRBC 0.00 0.00 - 0.01 K/uL    NEUTROPHILS 57 40 - 73 %    LYMPHOCYTES 32 21 - 52 %    MONOCYTES 7 3 - 10 %    EOSINOPHILS 3 0 - 5 %    BASOPHILS 1 0 - 2 %    IMMATURE GRANULOCYTES 0 0.0 - 0.5 %    ABS. NEUTROPHILS 3.1 1.8 - 8.0 K/UL    ABS. LYMPHOCYTES 1.8 0.9 - 3.6 K/UL    ABS. MONOCYTES 0.4 0.05 - 1.2 K/UL    ABS. EOSINOPHILS 0.2 0.0 - 0.4 K/UL    ABS. BASOPHILS 0.0 0.0 - 0.1 K/UL    ABS. IMM. GRANS. 0.0 0.00 - 0.04 K/UL    DF AUTOMATED     METABOLIC PANEL, COMPREHENSIVE    Collection Time: 12/28/22  3:50 PM   Result Value Ref Range    Sodium 140 136 - 145 mmol/L    Potassium 4.3 3.5 - 5.5 mmol/L    Chloride 105 100 - 111 mmol/L    CO2 29 21 - 32 mmol/L    Anion gap 6 3.0 - 18 mmol/L    Glucose 98 74 - 99 mg/dL    BUN 13 7.0 - 18 MG/DL    Creatinine 1.03 0.6 - 1.3 MG/DL    BUN/Creatinine ratio 13 12 - 20      eGFR >60 >60 ml/min/1.73m2    Calcium 9.6 8.5 - 10.1 MG/DL    Bilirubin, total 0.4 0.2 - 1.0 MG/DL    ALT (SGPT) 25 16 - 61 U/L    AST (SGOT) 16 10 - 38 U/L    Alk.  phosphatase 65 45 - 117 U/L    Protein, total 7.4 6.4 - 8.2 g/dL    Albumin 4.0 3.4 - 5.0 g/dL    Globulin 3.4 2.0 - 4.0 g/dL    A-G Ratio 1.2 0.8 - 1.7     TROPONIN-HIGH SENSITIVITY    Collection Time: 12/28/22  3:50 PM   Result Value Ref Range    Troponin-High Sensitivity 5 0 - 78 ng/L   ETHYL ALCOHOL    Collection Time: 12/28/22  3:50 PM   Result Value Ref Range    ALCOHOL(ETHYL),SERUM 46 (H) 0 - 3 MG/DL   LIPASE    Collection Time: 12/28/22  3:50 PM   Result Value Ref Range    Lipase 184 73 - 393 U/L   MAGNESIUM    Collection Time: 12/28/22  3:50 PM   Result Value Ref Range    Magnesium 2.2 1.6 - 2.6 mg/dL   COVID-19 WITH INFLUENZA A/B    Collection Time: 12/28/22  4:55 PM   Result Value Ref Range    SARS-CoV-2 by PCR Not detected NOTD      Influenza A by PCR Not detected NOTD      Influenza B by PCR Not detected NOTD         Radiologic Studies -   CT ABD PELV WO CONT   Final Result      No acute abnormalities identified in the abdomen. XR CHEST PA LAT   Final Result      No acute findings in the chest.        CT Results  (Last 48 hours)                 12/28/22 1758  CT ABD PELV WO CONT Final result    Impression:      No acute abnormalities identified in the abdomen. Narrative:  EXAM: CT of the abdomen and pelvis       CLINICAL INDICATION/HISTORY: Abdominal pain. COMPARISON: 7/13/2022       TECHNIQUE: Axial CT imaging of the abdomen and pelvis was performed without   intravenous contrast. Multiplanar reformats were generated. One or more dose   reduction techniques were used on this CT: automated exposure control,   adjustment of the mAs and/or kVp according to patient size, and iterative   reconstruction techniques. The specific techniques used on this CT exam have   been documented in the patient's electronic medical record. Digital Imaging and   Communications in Medicine (DICOM) format image data are available to   nonaffiliated external healthcare facilities or entities on a secured, media   free, reciprocally searchable basis with patient authorization for at least a   12-month period after this study. _______________       FINDINGS:       LOWER CHEST: Unremarkable. LIVER, BILIARY: Liver is unremarkable. No biliary dilation. Gallbladder is   unremarkable. PANCREAS: Unremarkable. SPLEEN: Unremarkable. ADRENALS: Unremarkable. KIDNEYS/URETERS/BLADDER: Unremarkable. PELVIC ORGANS: Unremarkable. LYMPH NODES: No enlarged lymph nodes. GASTROINTESTINAL TRACT: No bowel dilation or wall thickening.  The appendix is normal.       VASCULATURE: Unremarkable. BONES: No acute or aggressive osseous abnormalities identified. OTHER: None.       _______________                 CXR Results  (Last 48 hours)                 12/28/22 1540  XR CHEST PA LAT Final result    Impression:      No acute findings in the chest.       Narrative:  EXAM: XR CHEST PA LAT       CLINICAL INDICATION/HISTORY: cp   -Additional: None       COMPARISON: 12/9/2021       TECHNIQUE: PA and lateral views of the chest       _______________       FINDINGS:       HEART AND MEDIASTINUM: Cardiac size and mediastinal contours are within normal   limits       LUNGS AND PLEURAL SPACES: No focal pneumonic consolidation, pneumothorax or   pleural effusion       BONY THORAX AND SOFT TISSUES: No acute osseous abnormality       _______________                     Medical Decision Making   I am the first provider for this patient. I reviewed the vital signs, available nursing notes, past medical history, past surgical history, family history and social history. Vital Signs-Reviewed the patient's vital signs. Records Reviewed: Nursing Notes    Procedures:  EKG    Date/Time: 12/28/2022 3:17 PM  Performed by: TRACIE Ortiz  Authorized by: TRACIE Ortiz     ECG reviewed by ED Physician in the absence of a cardiologist: yes    Interpretation:     Interpretation: abnormal    Rate:     ECG rate:  99    ECG rate assessment: normal    Rhythm:     Rhythm: sinus rhythm    Ectopy:     Ectopy: none    QRS:     QRS axis:  Normal    QRS intervals:  Normal    QRS conduction: normal    ST segments:     ST segments:  Normal  T waves:     T waves: normal    Other findings:     Other findings: LVH      Provider Notes (Medical Decision Making): Patient with a history of alcohol abuse drinking 6 drinks a day with pain in his upper epigastric area rating up into his chest worse over the past 3 days not worse with exertion. Very stable vital signs and doubt PE.   Patient pancreas liver function tests are normal.  He is altered. CT scan shows no changes in his lungs or abdomen. Kidney function good. We will give trial of Maalox GI cocktail here and sent home with a prescription for GERD Protonix PPI and have him follow-up with his PCP. Had long talk with him about decreasing alcohol consumption and he said his PCP has prescribed him and a medication to help him reduce his cravings. Close follow-up with his PCP. MED RECONCILIATION:  Current Facility-Administered Medications   Medication Dose Route Frequency    pantoprazole (PROTONIX) 40 mg in 0.9% sodium chloride 10 mL injection  40 mg IntraVENous ONCE    lidocaine (XYLOCAINE) 2 % viscous solution 15 mL  15 mL Mouth/Throat ONCE    aluminum-magnesium hydroxide (MAALOX) oral suspension 30 mL  30 mL Oral NOW     Current Outpatient Medications   Medication Sig    FLUoxetine (PROzac) 20 mg capsule Take 40 mg by mouth daily. pantoprazole (Protonix) 40 mg tablet Take 1 Tablet by mouth daily for 20 days. ondansetron hcl (Zofran) 4 mg tablet Take 2 Tablets by mouth every eight (8) hours as needed for Nausea. clonazePAM (KlonoPIN) 0.5 mg tablet Take  by mouth nightly as needed for Anxiety. ondansetron hcl (Zofran) 4 mg tablet Take 1 Tablet by mouth every eight (8) hours as needed for Nausea. Disposition:  home    DISCHARGE NOTE:   6:48 PM    Pt has been reexamined. Patient has no new complaints, changes, or physical findings. Care plan outlined and precautions discussed. Results of labs CT were reviewed with the patient. All medications were reviewed with the patient; will d/c home with protonix. All of pt's questions and concerns were addressed. Patient was instructed and agrees to follow up with PCP, as well as to return to the ED upon further deterioration. Patient is ready to go home.     Follow-up Information       Follow up With Specialties Details Why Contact Info    [facility]  Schedule an appointment as soon as possible for a visit in 2 days  [id number] [facility], [address] [facility] [address]    [facility] EMERGENCY DEPT Emergency Medicine  If symptoms worsen return immediately [address] [id number] [phone]            Current Discharge Medication List        START taking these medications    Details   pantoprazole (Protonix) 40 mg tablet Take 1 Tablet by mouth daily for 20 days. Qty: 20 Tablet, Refills: 0  Start date: 12/28/2022, End date: 1/17/2023             Diagnosis     Clinical Impression:   1.  Gastroesophageal reflux disease with esophagitis without hemorrhage

## 2023-01-07 ENCOUNTER — HOSPITAL ENCOUNTER (EMERGENCY)
Age: 43
Discharge: HOME | End: 2023-01-07
Attending: EMERGENCY MEDICINE
Payer: MEDICAID

## 2023-01-07 VITALS
HEART RATE: 80 BPM | BODY MASS INDEX: 29.53 KG/M2 | OXYGEN SATURATION: 100 % | SYSTOLIC BLOOD PRESSURE: 134 MMHG | DIASTOLIC BLOOD PRESSURE: 92 MMHG | RESPIRATION RATE: 18 BRPM | WEIGHT: 230 LBS | TEMPERATURE: 97.8 F

## 2023-01-07 DIAGNOSIS — F41.1 ANXIETY STATE: Primary | ICD-10-CM

## 2023-01-07 DIAGNOSIS — G47.00 INSOMNIA, UNSPECIFIED TYPE: ICD-10-CM

## 2023-01-07 LAB
ATRIAL RATE: 78 BPM
CALCULATED P AXIS, ECG09: 46 DEGREES
CALCULATED R AXIS, ECG10: 41 DEGREES
CALCULATED T AXIS, ECG11: 45 DEGREES
DIAGNOSIS, 93000: NORMAL
P-R INTERVAL, ECG05: 136 MS
Q-T INTERVAL, ECG07: 372 MS
QRS DURATION, ECG06: 90 MS
QTC CALCULATION (BEZET), ECG08: 424 MS
VENTRICULAR RATE, ECG03: 78 BPM

## 2023-01-07 PROCEDURE — 99283 EMERGENCY DEPT VISIT LOW MDM: CPT

## 2023-01-07 RX ORDER — CLONAZEPAM 0.5 MG/1
0.5 TABLET ORAL
Qty: 6 TABLET | Refills: 0 | Status: SHIPPED | OUTPATIENT
Start: 2023-01-07 | End: 2023-01-07 | Stop reason: SDUPTHER

## 2023-01-07 RX ORDER — CLONAZEPAM 0.5 MG/1
0.5 TABLET ORAL
Qty: 6 TABLET | Refills: 0 | Status: SHIPPED | OUTPATIENT
Start: 2023-01-07

## 2023-01-07 NOTE — ED TRIAGE NOTES
States anxiety getting worse over past week, hard to sleep, work  Out of  Klonopin x 1 wk  Taking Prozac

## 2023-01-07 NOTE — ED PROVIDER NOTES
EMERGENCY DEPARTMENT HISTORY AND PHYSICAL EXAM      Date: 1/7/2023  Patient Name: Alonso Nieto    History of Presenting Illness     Chief Complaint   Patient presents with    Anxiety       History Provided By: Patient    HPI: Alonso Nieto, 38 y.o. male with past medical history to include anxiety and arthritis is presenting to the emergency department with exacerbation of his anxiety over the last few days. Patient reports that he is currently out of his Klonopin which was prescribed by his psychiatrist.  He has had trouble getting in touch with her over the last few days. He is also currently taking Prozac daily. Recent dose change. He states that anxiety is causing him to feel heart palpitations, chest tightness, and insomnia. He has been sleeping more during the day but is having a hard time sleeping at night due to racing thoughts. He denies fever, cough, recent travel, leg swelling, syncope. He denies suicidal ideation, drug use, hallucinations, paranoia. There are no other complaints, changes, or physical findings at this time. Past History   Past Medical History:  Past Medical History:   Diagnosis Date    Anxiety     Arthritis        Past Surgical History:  No past surgical history on file. Family History:  Family History   Problem Relation Age of Onset    Cancer Mother        Social History:  Social History     Tobacco Use    Smoking status: Every Day     Packs/day: 0.25     Years: 15.00     Pack years: 3.75     Types: Cigarettes    Smokeless tobacco: Never   Substance Use Topics    Alcohol use: Yes     Comment: social    Drug use: No     Comment: use to smoke marijuana years ago       Allergies:  No Known Allergies    PCP: None    Current Outpatient Medications   Medication Sig Dispense Refill    clonazePAM (KlonoPIN) 0.5 mg tablet Take 1 Tablet by mouth two (2) times daily as needed for Anxiety or Sleep.  Max Daily Amount: 1 mg. 6 Tablet 0    FLUoxetine (PROzac) 20 mg capsule Take 40 mg by mouth daily. pantoprazole (Protonix) 40 mg tablet Take 1 Tablet by mouth daily for 20 days. 20 Tablet 0    ondansetron hcl (Zofran) 4 mg tablet Take 2 Tablets by mouth every eight (8) hours as needed for Nausea. 12 Tablet 0    ondansetron hcl (Zofran) 4 mg tablet Take 1 Tablet by mouth every eight (8) hours as needed for Nausea. 14 Tablet 0     Review of Systems   Review of Systems   Constitutional:  Negative for activity change, chills and fever. HENT:  Negative for congestion, ear pain, rhinorrhea and trouble swallowing. Eyes:  Negative for pain and visual disturbance. Respiratory:  Positive for chest tightness. Negative for cough and shortness of breath. Cardiovascular:  Negative for chest pain. Gastrointestinal:  Negative for abdominal pain, diarrhea, nausea and vomiting. Genitourinary:  Negative for decreased urine volume, difficulty urinating, dysuria and hematuria. Musculoskeletal:  Negative for arthralgias and myalgias. Skin:  Negative for rash. Neurological:  Negative for weakness and headaches. Hematological:  Negative for adenopathy. Psychiatric/Behavioral:  Positive for sleep disturbance. Negative for agitation, hallucinations and suicidal ideas. The patient is nervous/anxious. All other systems reviewed and are negative. Physical Exam   Physical Exam  Vitals and nursing note reviewed. Constitutional:       General: He is not in acute distress. Appearance: He is well-developed. He is not ill-appearing. HENT:      Head: Normocephalic and atraumatic. Mouth/Throat:      Mouth: Mucous membranes are moist.   Eyes:      Extraocular Movements: Extraocular movements intact. Pupils: Pupils are equal, round, and reactive to light. Cardiovascular:      Rate and Rhythm: Normal rate and regular rhythm. Pulses: Normal pulses. Heart sounds: Normal heart sounds. Pulmonary:      Effort: Pulmonary effort is normal. No respiratory distress.       Breath sounds: Normal breath sounds. Skin:     General: Skin is warm and dry. Capillary Refill: Capillary refill takes less than 2 seconds. Findings: No rash. Neurological:      General: No focal deficit present. Mental Status: He is alert. Cranial Nerves: No cranial nerve deficit. Psychiatric:         Attention and Perception: Attention and perception normal.         Mood and Affect: Affect normal. Mood is anxious. Speech: Speech normal.         Behavior: Behavior normal.         Thought Content: Thought content normal.         Cognition and Memory: Cognition normal.         Judgment: Judgment normal.       Lab and Diagnostic Study Results   Labs -     Recent Results (from the past 100 hour(s))   EKG, 12 LEAD, INITIAL    Collection Time: 01/07/23  4:33 PM   Result Value Ref Range    Ventricular Rate 78 BPM    Atrial Rate 78 BPM    P-R Interval 136 ms    QRS Duration 90 ms    Q-T Interval 372 ms    QTC Calculation (Bezet) 424 ms    Calculated P Axis 46 degrees    Calculated R Axis 41 degrees    Calculated T Axis 45 degrees    Diagnosis       Normal sinus rhythm  Normal ECG  When compared with ECG of 28-DEC-2022 15:17,  No significant change was found           Radiologic Studies -   [unfilled]  CT Results  (Last 48 hours)      None          CXR Results  (Last 48 hours)      None            Medical Decision Making and ED Course   Differential Diagnosis & Medical Decision Making Provider Note:   49-year-old male is presenting to the emergency department with anxiety and insomnia. He feels a tightness in his chest.  He is not currently having a panic attack but appears anxious on exam.  Is followed by the country and is taking a daily SSRI as well as Klonopin as needed. He is currently out of his Klonopin which has been affecting his anxiety and sleep pattern over the last few days. He has been unable to reach his psychiatrist but was afraid he was going to have another panic attack. He is not actively psychotic or suicidal.  EKG without evidence of ischemia. Considered ACS however no chest pain and minimal risk factors. As well as patient's symptoms feel the same as prior episodes of anxiety. Mildly elevated blood pressure however vitals are otherwise stable. Will refill Klonopin for few days until patient can get in touch with his psychiatrist.  Lengthy discussion regarding healthy mental health habits and sleep hygiene. Voices understanding of the discharge plan. - I am the first and primary provider for this patient. I reviewed the vital signs, available nursing notes, past medical history, past surgical history, family history and social history. The patient's presenting problems have been discussed, and the staff are in agreement with the care plan formulated and outlined with them. I have encouraged them to ask questions as they arise throughout their visit. Vital Signs-Reviewed the patient's vital signs. Wt Readings from Last 3 Encounters:   01/07/23 104.3 kg (230 lb)   07/13/22 104.3 kg (230 lb)   12/19/21 106.6 kg (235 lb)     Temp Readings from Last 3 Encounters:   01/07/23 97.8 °F (36.6 °C)   12/28/22 97.3 °F (36.3 °C)   07/13/22 97.1 °F (36.2 °C)     BP Readings from Last 3 Encounters:   01/07/23 (!) 134/92   12/28/22 130/72   07/13/22 112/64     Pulse Readings from Last 3 Encounters:   01/07/23 80   12/28/22 95   07/13/22 78       No data found. EKG interpretation: (Preliminary): EKG Interpreted by me. Shows normal sinus rhythm at 78 bpm, normal EKG, no STEMI    ED Course:        HYPERTENSION COUNSELING: Education was provided to the patient today regarding their hypertension. Patient is made aware of their elevated blood pressure and is instructed to follow up this week with their Primary Care for a recheck. Patient is counseled regarding consequences of chronic, uncontrolled hypertension including kidney disease, heart disease, stroke or even death.  Patient states their understanding and agrees to follow up this week. Additionally, during their visit, I discussed sodium restriction, maintaining ideal body weight and regular exercise program as physiologic means to achieve blood pressure control. The patient will strive towards this. Procedures and Critical Care     Performed by: Alma Og PA-C  Procedures      Alma Og PA-C    Disposition   Disposition: DC- Adult Discharges: All of the diagnostic tests were reviewed and questions answered. Diagnosis, care plan and treatment options were discussed. The patient understands the instructions and will follow up as directed. The patients results have been reviewed with them. They have been counseled regarding their diagnosis. The patient verbally convey understanding and agreement of the signs, symptoms, diagnosis, treatment and prognosis and additionally agrees to follow up as recommended with their PCP in 24 - 48 hours. They also agree with the care-plan and convey that all of their questions have been answered. I have also put together some discharge instructions for them that include: 1) educational information regarding their diagnosis, 2) how to care for their diagnosis at home, as well a 3) list of reasons why they would want to return to the ED prior to their follow-up appointment, should their condition change. Discharged     DISCHARGE PLAN:  1. Current Discharge Medication List        CONTINUE these medications which have NOT CHANGED    Details   FLUoxetine (PROzac) 20 mg capsule Take 40 mg by mouth daily. pantoprazole (Protonix) 40 mg tablet Take 1 Tablet by mouth daily for 20 days. Qty: 20 Tablet, Refills: 0      !! ondansetron hcl (Zofran) 4 mg tablet Take 2 Tablets by mouth every eight (8) hours as needed for Nausea. Qty: 12 Tablet, Refills: 0      clonazePAM (KlonoPIN) 0.5 mg tablet Take  by mouth nightly as needed for Anxiety.       !! ondansetron hcl (Zofran) 4 mg tablet Take 1 Tablet by mouth every eight (8) hours as needed for Nausea. Qty: 14 Tablet, Refills: 0       !! - Potential duplicate medications found. Please discuss with provider. 2.   Follow-up Information       Follow up With Specialties Details Why Contact Tyree Belle NP Nurse Practitioner Schedule an appointment as soon as possible for a visit  for follow up from ER visit [address] [facility]  [address] [address]  [phone]      THE [facility] EMERGENCY DEPT Emergency Medicine  As needed, If symptoms worsen [address] [address]  [phone]          3. Return to ED if worse   4. Discharge Medication List as of 1/7/2023  5:57 PM        CONTINUE these medications which have CHANGED    Details   clonazePAM (KlonoPIN) 0.5 mg tablet Take 1 Tablet by mouth two (2) times daily as needed for Anxiety or Sleep. Max Daily Amount: 1 mg., Normal, Disp-6 Tablet, R-0Dr. Goldie Frazier, ED Attending           CONTINUE these medications which have NOT CHANGED    Details   FLUoxetine (PROzac) 20 mg capsule Take 40 mg by mouth daily. , Historical Med      pantoprazole (Protonix) 40 mg tablet Take 1 Tablet by mouth daily for 20 days. , Normal, Disp-20 Tablet, R-0      !! ondansetron hcl (Zofran) 4 mg tablet Take 2 Tablets by mouth every eight (8) hours as needed for Nausea., Normal, Disp-12 Tablet, R-0      !! ondansetron hcl (Zofran) 4 mg tablet Take 1 Tablet by mouth every eight (8) hours as needed for Nausea., Normal, Disp-14 Tablet, R-0       !! - Potential duplicate medications found. Please discuss with provider. Diagnosis/Clinical Impression     Clinical Impression:   1. Anxiety state    2. Insomnia, unspecified type        Attestations: Veronica CARTY PA-C, am the primary clinician of record. Please note that this dictation was completed with [employer], the [employer] voice recognition software.   Quite often unanticipated grammatical, syntax, homophones, and other interpretive errors are inadvertently transcribed by the computer software. Please disregard these errors. Please excuse any errors that have escaped final proofreading. Thank you.

## 2023-07-24 ENCOUNTER — HOSPITAL ENCOUNTER (EMERGENCY)
Facility: HOSPITAL | Age: 43
Discharge: HOME | End: 2023-07-24
Attending: EMERGENCY MEDICINE
Payer: MEDICAID

## 2023-07-24 VITALS
WEIGHT: 209.44 LBS | HEIGHT: 73 IN | SYSTOLIC BLOOD PRESSURE: 158 MMHG | TEMPERATURE: 97.8 F | RESPIRATION RATE: 16 BRPM | HEART RATE: 75 BPM | BODY MASS INDEX: 27.76 KG/M2 | DIASTOLIC BLOOD PRESSURE: 93 MMHG | OXYGEN SATURATION: 100 %

## 2023-07-24 DIAGNOSIS — K08.89 TOOTHACHE: Primary | ICD-10-CM

## 2023-07-24 PROCEDURE — 99284 EMERGENCY DEPT VISIT MOD MDM: CPT

## 2023-07-24 PROCEDURE — 6360000002 HC RX W HCPCS: Performed by: PHYSICIAN ASSISTANT

## 2023-07-24 PROCEDURE — 96372 THER/PROPH/DIAG INJ SC/IM: CPT

## 2023-07-24 RX ORDER — KETOROLAC TROMETHAMINE 30 MG/ML
30 INJECTION, SOLUTION INTRAMUSCULAR; INTRAVENOUS
Status: COMPLETED | OUTPATIENT
Start: 2023-07-24 | End: 2023-07-24

## 2023-07-24 RX ORDER — PENICILLIN V POTASSIUM 500 MG/1
500 TABLET ORAL 4 TIMES DAILY
Qty: 40 TABLET | Refills: 0 | Status: SHIPPED | OUTPATIENT
Start: 2023-07-24 | End: 2023-08-03

## 2023-07-24 RX ADMIN — KETOROLAC TROMETHAMINE 30 MG: 30 INJECTION, SOLUTION INTRAMUSCULAR; INTRAVENOUS at 09:21

## 2023-07-24 ASSESSMENT — PAIN SCALES - GENERAL
PAINLEVEL_OUTOF10: 10
PAINLEVEL_OUTOF10: 8

## 2023-07-24 ASSESSMENT — PAIN - FUNCTIONAL ASSESSMENT: PAIN_FUNCTIONAL_ASSESSMENT: 0-10

## 2023-07-24 ASSESSMENT — PAIN DESCRIPTION - DESCRIPTORS: DESCRIPTORS: THROBBING

## 2023-07-24 ASSESSMENT — PAIN DESCRIPTION - FREQUENCY: FREQUENCY: CONTINUOUS

## 2023-07-24 ASSESSMENT — PAIN DESCRIPTION - ORIENTATION: ORIENTATION: RIGHT;UPPER;LOWER

## 2023-07-24 ASSESSMENT — PAIN DESCRIPTION - LOCATION: LOCATION: MOUTH

## 2023-07-24 NOTE — DISCHARGE INSTRUCTIONS
Dental resources:    Emergency [address] [facility] [address]   [address], [place], [address] [facility]   Monday, Wednesday, Friday: 8am-5pm   Tuesday and Thursday: 8am-6pm   Phone: [phone]   $70 for Emergency Care   $60 for first routine care, then pay by sliding scale based upon income     [facility]   [address], [address] [place]   Phone: [phone], select option (2) to confirm time for treatment     [facility]   [address], [address]   Monday-Friday: 8am-4pm   Phone: [phone] of Dentistry Urgent [address] [facility], [address] [place] [address], [address] [place] [address]   Phone: [phone] to confirm a time for emergency treatment   Pediatrics: [phone]   $75 per tooth, extractions only     [facility]   [address], [address]  [place] [address]   Phone [phone] or [phone]   Hours [id number] (extractions)   Simple tooth extraction: $60 per tooth, $55 per x-ray     [facility] (in [place])   [facility], [place] AT [place] only   Phone: [phone], leave message saying you need an appointment to register   Hours: Wed 6-9p       Non-Urgent [facility] [address] at [address] [facility] [address], [facility], [address] [facility]   Dental Clinic: [phone]   Oral Surgery Clinic: [phone]     Motrin 200mg, 2-3 every 8 hours with food as needed for pain. Tylenol 325mg, 2 every 6-8 hours as needed for additional pain control. Antibiotic as prescribed. Warm salt water rinses to your mouth, or 1/2 strength listerine 4 times a day. Soft food as tolerated. Follow up with dentist as discussed. Return to ED if fever, facial swelling to eye, neck stiffness, or difficulty swallowing, or any new concerns.

## 2023-07-24 NOTE — ED TRIAGE NOTES
Pt reports that he has had right sided mouth pain and facial swelling for 3 days;  OTC advil not helping the pain;  denies fever  unable to chew

## 2023-10-05 ENCOUNTER — HOSPITAL ENCOUNTER (EMERGENCY)
Facility: HOSPITAL | Age: 43
Discharge: HOME | End: 2023-10-06
Payer: MEDICAID

## 2023-10-05 VITALS
HEART RATE: 98 BPM | SYSTOLIC BLOOD PRESSURE: 126 MMHG | HEIGHT: 73 IN | BODY MASS INDEX: 28.49 KG/M2 | OXYGEN SATURATION: 98 % | RESPIRATION RATE: 22 BRPM | WEIGHT: 215 LBS | TEMPERATURE: 99.3 F | DIASTOLIC BLOOD PRESSURE: 85 MMHG

## 2023-10-05 DIAGNOSIS — R11.2 NAUSEA AND VOMITING, UNSPECIFIED VOMITING TYPE: ICD-10-CM

## 2023-10-05 DIAGNOSIS — J06.9 ACUTE UPPER RESPIRATORY INFECTION: Primary | ICD-10-CM

## 2023-10-05 DIAGNOSIS — R05.9 COUGH, UNSPECIFIED TYPE: ICD-10-CM

## 2023-10-05 LAB
ALBUMIN SERPL-MCNC: 3.5 G/DL (ref 3.4–5)
ALBUMIN/GLOB SERPL: 1.1 (ref 0.8–1.7)
ALP SERPL-CCNC: 62 U/L (ref 45–117)
ALT SERPL-CCNC: 23 U/L (ref 16–61)
ANION GAP SERPL CALC-SCNC: 2 MMOL/L (ref 3–18)
AST SERPL-CCNC: 11 U/L (ref 10–38)
BASOPHILS # BLD: 0 K/UL (ref 0–0.1)
BASOPHILS NFR BLD: 1 % (ref 0–2)
BILIRUB SERPL-MCNC: 0.3 MG/DL (ref 0.2–1)
BUN SERPL-MCNC: 11 MG/DL (ref 7–18)
BUN/CREAT SERPL: 9 (ref 12–20)
CALCIUM SERPL-MCNC: 8.5 MG/DL (ref 8.5–10.1)
CHLORIDE SERPL-SCNC: 108 MMOL/L (ref 100–111)
CO2 SERPL-SCNC: 29 MMOL/L (ref 21–32)
CREAT SERPL-MCNC: 1.17 MG/DL (ref 0.6–1.3)
DIFFERENTIAL METHOD BLD: ABNORMAL
EOSINOPHIL # BLD: 0.2 K/UL (ref 0–0.4)
EOSINOPHIL NFR BLD: 5 % (ref 0–5)
ERYTHROCYTE [DISTWIDTH] IN BLOOD BY AUTOMATED COUNT: 12.5 % (ref 11.6–14.5)
GLOBULIN SER CALC-MCNC: 3.2 G/DL (ref 2–4)
GLUCOSE SERPL-MCNC: 110 MG/DL (ref 74–99)
HCT VFR BLD AUTO: 40.7 % (ref 36–48)
HGB BLD-MCNC: 13.4 G/DL (ref 13–16)
IMM GRANULOCYTES # BLD AUTO: 0 K/UL (ref 0–0.04)
IMM GRANULOCYTES NFR BLD AUTO: 1 % (ref 0–0.5)
LYMPHOCYTES # BLD: 1.2 K/UL (ref 0.9–3.6)
LYMPHOCYTES NFR BLD: 28 % (ref 21–52)
MCH RBC QN AUTO: 32.8 PG (ref 24–34)
MCHC RBC AUTO-ENTMCNC: 32.9 G/DL (ref 31–37)
MCV RBC AUTO: 99.8 FL (ref 78–100)
MONOCYTES # BLD: 0.6 K/UL (ref 0.05–1.2)
MONOCYTES NFR BLD: 14 % (ref 3–10)
NEUTS SEG # BLD: 2.3 K/UL (ref 1.8–8)
NEUTS SEG NFR BLD: 52 % (ref 40–73)
NRBC # BLD: 0 K/UL (ref 0–0.01)
NRBC BLD-RTO: 0 PER 100 WBC
PLATELET # BLD AUTO: 180 K/UL (ref 135–420)
PMV BLD AUTO: 9.8 FL (ref 9.2–11.8)
POTASSIUM SERPL-SCNC: 4.1 MMOL/L (ref 3.5–5.5)
PROT SERPL-MCNC: 6.7 G/DL (ref 6.4–8.2)
RBC # BLD AUTO: 4.08 M/UL (ref 4.35–5.65)
SODIUM SERPL-SCNC: 139 MMOL/L (ref 136–145)
TROPONIN I SERPL HS-MCNC: 8 NG/L (ref 0–78)
WBC # BLD AUTO: 4.4 K/UL (ref 4.6–13.2)

## 2023-10-05 PROCEDURE — 80053 COMPREHEN METABOLIC PANEL: CPT

## 2023-10-05 PROCEDURE — 85025 COMPLETE CBC W/AUTO DIFF WBC: CPT

## 2023-10-05 PROCEDURE — 84484 ASSAY OF TROPONIN QUANT: CPT

## 2023-10-05 PROCEDURE — 99285 EMERGENCY DEPT VISIT HI MDM: CPT

## 2023-10-05 PROCEDURE — 93005 ELECTROCARDIOGRAM TRACING: CPT | Performed by: EMERGENCY MEDICINE

## 2023-10-05 PROCEDURE — 94760 N-INVAS EAR/PLS OXIMETRY 1: CPT

## 2023-10-05 ASSESSMENT — PAIN SCALES - GENERAL: PAINLEVEL_OUTOF10: 8

## 2023-10-06 ENCOUNTER — APPOINTMENT (OUTPATIENT)
Facility: HOSPITAL | Age: 43
End: 2023-10-06
Payer: MEDICAID

## 2023-10-06 LAB
EKG ATRIAL RATE: 89 BPM
EKG DIAGNOSIS: NORMAL
EKG P AXIS: 62 DEGREES
EKG P-R INTERVAL: 132 MS
EKG Q-T INTERVAL: 328 MS
EKG QRS DURATION: 84 MS
EKG QTC CALCULATION (BAZETT): 399 MS
EKG R AXIS: 70 DEGREES
EKG T AXIS: 54 DEGREES
EKG VENTRICULAR RATE: 89 BPM
FLUAV RNA SPEC QL NAA+PROBE: NOT DETECTED
FLUBV RNA SPEC QL NAA+PROBE: NOT DETECTED
SARS-COV-2 RNA RESP QL NAA+PROBE: NOT DETECTED
TROPONIN I SERPL HS-MCNC: 8 NG/L (ref 0–78)

## 2023-10-06 PROCEDURE — 71046 X-RAY EXAM CHEST 2 VIEWS: CPT

## 2023-10-06 RX ORDER — IBUPROFEN 800 MG/1
800 TABLET ORAL 2 TIMES DAILY PRN
Qty: 30 TABLET | Refills: 1 | Status: SHIPPED | OUTPATIENT
Start: 2023-10-06

## 2023-10-06 RX ORDER — ONDANSETRON 4 MG/1
4 TABLET, ORALLY DISINTEGRATING ORAL 3 TIMES DAILY PRN
Qty: 21 TABLET | Refills: 0 | Status: SHIPPED | OUTPATIENT
Start: 2023-10-06

## 2023-10-06 RX ORDER — ALBUTEROL SULFATE 90 UG/1
2 AEROSOL, METERED RESPIRATORY (INHALATION) 4 TIMES DAILY PRN
Qty: 54 G | Refills: 1 | Status: SHIPPED | OUTPATIENT
Start: 2023-10-06

## 2023-10-06 RX ORDER — BENZONATATE 200 MG/1
200 CAPSULE ORAL 3 TIMES DAILY PRN
Qty: 30 CAPSULE | Refills: 0 | Status: SHIPPED | OUTPATIENT
Start: 2023-10-06 | End: 2023-10-13

## 2023-10-06 NOTE — ED PROVIDER NOTES
(cough) present. Abdominal:      General: Bowel sounds are normal.      Palpations: Abdomen is soft. Tenderness: There is no abdominal tenderness. There is no right CVA tenderness or left CVA tenderness. Skin:     General: Skin is warm and dry. Neurological:      General: No focal deficit present. Mental Status: He is alert. Diagnostic Study Results     Labs:  No results found for this or any previous visit (from the past 12 hour(s)). Radiologic Studies:   XR CHEST (2 VW)   Final Result   No acute process. EKG interpretation: (Preliminary)  Normal sinus rhythm rate 89 bpm Motion artifact  EKG read by Humphrey Mai. Coretta Conroy MD at 2121     Procedures     Procedures    ED Course     11:38 PM EDT I Jacquelin Roca PA-C) am the first provider for this patient. Initial assessment performed. I reviewed the vital signs, available nursing notes, past medical history, past surgical history, family history and social history. The patients presenting problems have been discussed, and they are in agreement with the care plan formulated and outlined with them. I have encouraged them to ask questions as they arise throughout their visit. Records Reviewed: Nursing Notes, Old Medical Records, Previous Radiology Studies, and Previous Laboratory Studies    Is this patient to be included in the SEP-1 core measure? No Exclusion criteria - the patient is NOT to be included for SEP-1 Core Measure due to: 2+ SIRS criteria are not met    MEDICATIONS ADMINISTERED IN THE ED:  Medications - No data to display         Medical Decision Making     CC/HPI Summary, DDx, ED Course, and Reassessment:   Pt presents c/o uri sxs cough and diarrhea and vomiting. He is otherwise healthy. Labs within normal limits, COVID flu negative. Patient is nontoxic and in no acute distress.   Will treat symptomatically and have patient follow-up with his PCP suspect viral etiology    Disposition Considerations (tests software. Please disregard these errors. Please excuse any errors that have escaped final proofreading.     Tyesha Quezada PA-C  (Electronically signed)         Josue Bridges [place]  10/07/23 1612

## 2023-10-06 NOTE — ED NOTES
Pt presents ambulatory to triage in NAD  Pt presents c/o emesis, cough and dizziness x today while at work. Pt states that his chest feels tight and he is fatigued.      Chela Loyd RN  10/05/23 5757

## 2024-01-04 ENCOUNTER — HOSPITAL ENCOUNTER (EMERGENCY)
Facility: HOSPITAL | Age: 44
Discharge: HOME | End: 2024-01-05
Payer: MEDICAID

## 2024-01-04 ENCOUNTER — APPOINTMENT (OUTPATIENT)
Facility: HOSPITAL | Age: 44
End: 2024-01-04
Payer: MEDICAID

## 2024-01-04 DIAGNOSIS — F41.9 ANXIETY: Primary | ICD-10-CM

## 2024-01-04 LAB
EKG ATRIAL RATE: 98 BPM
EKG DIAGNOSIS: NORMAL
EKG P AXIS: 41 DEGREES
EKG P-R INTERVAL: 142 MS
EKG Q-T INTERVAL: 342 MS
EKG QRS DURATION: 84 MS
EKG QTC CALCULATION (BAZETT): 436 MS
EKG R AXIS: 51 DEGREES
EKG T AXIS: 39 DEGREES
EKG VENTRICULAR RATE: 98 BPM

## 2024-01-04 PROCEDURE — 96374 THER/PROPH/DIAG INJ IV PUSH: CPT

## 2024-01-04 PROCEDURE — 6360000002 HC RX W HCPCS: Performed by: EMERGENCY MEDICINE

## 2024-01-04 PROCEDURE — 99284 EMERGENCY DEPT VISIT MOD MDM: CPT

## 2024-01-04 PROCEDURE — 71046 X-RAY EXAM CHEST 2 VIEWS: CPT

## 2024-01-04 RX ORDER — CLONAZEPAM 0.5 MG/1
0.5 TABLET ORAL 2 TIMES DAILY PRN
Qty: 30 TABLET | Refills: 0 | Status: SHIPPED | OUTPATIENT
Start: 2024-01-04 | End: 2024-02-01

## 2024-01-04 RX ORDER — MIDAZOLAM HYDROCHLORIDE 2 MG/2ML
2 INJECTION, SOLUTION INTRAMUSCULAR; INTRAVENOUS
Status: COMPLETED | OUTPATIENT
Start: 2024-01-04 | End: 2024-01-04

## 2024-01-04 RX ADMIN — MIDAZOLAM HYDROCHLORIDE 2 MG: 1 INJECTION, SOLUTION INTRAMUSCULAR; INTRAVENOUS at 22:35

## 2024-01-04 ASSESSMENT — PAIN - FUNCTIONAL ASSESSMENT: PAIN_FUNCTIONAL_ASSESSMENT: NONE - DENIES PAIN

## 2024-01-04 ASSESSMENT — ENCOUNTER SYMPTOMS
ALLERGIC/IMMUNOLOGIC NEGATIVE: 1
EYES NEGATIVE: 1
GASTROINTESTINAL NEGATIVE: 1
SHORTNESS OF BREATH: 1

## 2024-01-05 VITALS
WEIGHT: 210 LBS | HEART RATE: 87 BPM | HEIGHT: 74 IN | SYSTOLIC BLOOD PRESSURE: 120 MMHG | RESPIRATION RATE: 16 BRPM | OXYGEN SATURATION: 100 % | DIASTOLIC BLOOD PRESSURE: 81 MMHG | TEMPERATURE: 98.3 F | BODY MASS INDEX: 26.95 KG/M2

## 2024-01-05 RX ORDER — TRAZODONE HYDROCHLORIDE 50 MG/1
50 TABLET ORAL NIGHTLY
Qty: 16 TABLET | Refills: 1 | Status: SHIPPED | OUTPATIENT
Start: 2024-01-05

## 2024-01-05 NOTE — ED TRIAGE NOTES
Pt walked to ems station to complain that he was sob and out of his anxiety meds. Pt states his anxiety has been acting up for a few days now. Pt takes klonopin 0.5 mg states he is in need of a refill.. NO other complaints voiced at this time

## 2024-01-05 NOTE — ED PROVIDER NOTES
daily as needed for Pain    ONDANSETRON (ZOFRAN-ODT) 4 MG DISINTEGRATING TABLET    Take 1 tablet by mouth 3 times daily as needed for Nausea or Vomiting    PANTOPRAZOLE (PROTONIX) 40 MG TABLET    Take 40 mg by mouth daily       ALLERGIES     Patient has no known allergies.    FAMILY HISTORY       Family History   Problem Relation Age of Onset    Cancer Mother           SOCIAL HISTORY       Social History     Socioeconomic History    Marital status: Legally    Tobacco Use    Smoking status: Every Day     Current packs/day: 0.25     Types: Cigarettes    Smokeless tobacco: Never   Substance and Sexual Activity    Alcohol use: Yes    Drug use: No   Social History Narrative    ** Merged History Encounter **            SCREENINGS         [place] Coma Scale  Eye Opening: Spontaneous  Best Verbal Response: Oriented  Best Motor Response: Obeys commands  [place] Coma Scale Score: 15                     CIWA Assessment  BP: (!) 141/96  Pulse: 100                 PHYSICAL EXAM       ED Triage Vitals [01/04/24 2135]   BP Temp Temp Source Pulse Respirations SpO2 Height Weight - Scale   (!) 156/95 98.3 °F (36.8 °C) Oral 98 18 99 % 1.88 m (6' 2\") 95.3 kg (210 lb)       Physical Exam  Vitals and nursing note reviewed.   Constitutional:       General: He is not in acute distress.     Appearance: Normal appearance. He is normal weight. He is not ill-appearing, toxic-appearing or diaphoretic.   HENT:      Head: Normocephalic and atraumatic.      Nose: Nose normal.      Mouth/Throat:      Mouth: Mucous membranes are moist.   Eyes:      Extraocular Movements: Extraocular movements intact.   Cardiovascular:      Rate and Rhythm: Normal rate and regular rhythm.      Pulses: Normal pulses.      Heart sounds: Normal heart sounds.   Pulmonary:      Effort: Pulmonary effort is normal.      Breath sounds: Normal breath sounds. No wheezing or rales.   Abdominal:      General: Abdomen is flat.      Palpations: Abdomen is soft.

## 2024-07-05 ENCOUNTER — HOSPITAL ENCOUNTER (EMERGENCY)
Facility: HOSPITAL | Age: 44
Discharge: HOME | End: 2024-07-05

## 2024-07-05 VITALS
BODY MASS INDEX: 25.28 KG/M2 | HEIGHT: 74 IN | WEIGHT: 197 LBS | DIASTOLIC BLOOD PRESSURE: 76 MMHG | TEMPERATURE: 98.3 F | RESPIRATION RATE: 16 BRPM | OXYGEN SATURATION: 100 % | SYSTOLIC BLOOD PRESSURE: 134 MMHG | HEART RATE: 93 BPM

## 2024-07-05 DIAGNOSIS — R20.2 PARESTHESIA: Primary | ICD-10-CM

## 2024-07-05 LAB
ALBUMIN SERPL-MCNC: 3.6 G/DL (ref 3.4–5)
ALBUMIN/GLOB SERPL: 1.2 (ref 0.8–1.7)
ALP SERPL-CCNC: 61 U/L (ref 45–117)
ALT SERPL-CCNC: 18 U/L (ref 16–61)
ANION GAP SERPL CALC-SCNC: 5 MMOL/L (ref 3–18)
APPEARANCE UR: CLEAR
AST SERPL-CCNC: 10 U/L (ref 10–38)
BASOPHILS # BLD: 0 K/UL (ref 0–0.1)
BASOPHILS NFR BLD: 0 % (ref 0–2)
BILIRUB SERPL-MCNC: 0.5 MG/DL (ref 0.2–1)
BILIRUB UR QL: NEGATIVE
BUN SERPL-MCNC: 16 MG/DL (ref 7–18)
BUN/CREAT SERPL: 14 (ref 12–20)
CALCIUM SERPL-MCNC: 9.2 MG/DL (ref 8.5–10.1)
CHLORIDE SERPL-SCNC: 106 MMOL/L (ref 100–111)
CO2 SERPL-SCNC: 28 MMOL/L (ref 21–32)
COLOR UR: YELLOW
CREAT SERPL-MCNC: 1.11 MG/DL (ref 0.6–1.3)
DIFFERENTIAL METHOD BLD: ABNORMAL
EOSINOPHIL # BLD: 0.1 K/UL (ref 0–0.4)
EOSINOPHIL NFR BLD: 2 % (ref 0–5)
ERYTHROCYTE [DISTWIDTH] IN BLOOD BY AUTOMATED COUNT: 13.2 % (ref 11.6–14.5)
GLOBULIN SER CALC-MCNC: 2.9 G/DL (ref 2–4)
GLUCOSE SERPL-MCNC: 92 MG/DL (ref 74–99)
GLUCOSE UR STRIP.AUTO-MCNC: NEGATIVE MG/DL
HCT VFR BLD AUTO: 38.7 % (ref 36–48)
HGB BLD-MCNC: 12.6 G/DL (ref 13–16)
HGB UR QL STRIP: NEGATIVE
IMM GRANULOCYTES # BLD AUTO: 0 K/UL (ref 0–0.04)
IMM GRANULOCYTES NFR BLD AUTO: 0 % (ref 0–0.5)
KETONES UR QL STRIP.AUTO: NEGATIVE MG/DL
LEUKOCYTE ESTERASE UR QL STRIP.AUTO: NEGATIVE
LYMPHOCYTES # BLD: 1.3 K/UL (ref 0.9–3.6)
LYMPHOCYTES NFR BLD: 27 % (ref 21–52)
MAGNESIUM SERPL-MCNC: 2 MG/DL (ref 1.6–2.6)
MCH RBC QN AUTO: 33.2 PG (ref 24–34)
MCHC RBC AUTO-ENTMCNC: 32.6 G/DL (ref 31–37)
MCV RBC AUTO: 101.8 FL (ref 78–100)
MONOCYTES # BLD: 0.4 K/UL (ref 0.05–1.2)
MONOCYTES NFR BLD: 8 % (ref 3–10)
NEUTS SEG # BLD: 3.1 K/UL (ref 1.8–8)
NEUTS SEG NFR BLD: 62 % (ref 40–73)
NITRITE UR QL STRIP.AUTO: NEGATIVE
NRBC # BLD: 0 K/UL (ref 0–0.01)
NRBC BLD-RTO: 0 PER 100 WBC
PH UR STRIP: 7 (ref 5–8)
PLATELET # BLD AUTO: 175 K/UL (ref 135–420)
PMV BLD AUTO: 9.7 FL (ref 9.2–11.8)
POTASSIUM SERPL-SCNC: 4.3 MMOL/L (ref 3.5–5.5)
PROT SERPL-MCNC: 6.5 G/DL (ref 6.4–8.2)
PROT UR STRIP-MCNC: NEGATIVE MG/DL
RBC # BLD AUTO: 3.8 M/UL (ref 4.35–5.65)
SODIUM SERPL-SCNC: 139 MMOL/L (ref 136–145)
SP GR UR REFRACTOMETRY: 1.01 (ref 1–1.03)
TSH SERPL DL<=0.05 MIU/L-ACNC: 0.44 UIU/ML (ref 0.36–3.74)
UROBILINOGEN UR QL STRIP.AUTO: 1 EU/DL (ref 0.2–1)
WBC # BLD AUTO: 4.9 K/UL (ref 4.6–13.2)

## 2024-07-05 PROCEDURE — 99283 EMERGENCY DEPT VISIT LOW MDM: CPT

## 2024-07-05 PROCEDURE — 80053 COMPREHEN METABOLIC PANEL: CPT

## 2024-07-05 PROCEDURE — 85025 COMPLETE CBC W/AUTO DIFF WBC: CPT

## 2024-07-05 PROCEDURE — 83735 ASSAY OF MAGNESIUM: CPT

## 2024-07-05 PROCEDURE — 84443 ASSAY THYROID STIM HORMONE: CPT

## 2024-07-05 PROCEDURE — 81003 URINALYSIS AUTO W/O SCOPE: CPT

## 2024-07-05 ASSESSMENT — PAIN - FUNCTIONAL ASSESSMENT: PAIN_FUNCTIONAL_ASSESSMENT: NONE - DENIES PAIN

## 2024-07-05 NOTE — ED TRIAGE NOTES
Patient arrives ambulatory to ED with report of numbness to feet and arms for months. Ashwinitent states it comes and goes and it is when he eats something. Patient states diabetes runs in his family and wants to be checked to see if he has diabetes.

## 2024-07-05 NOTE — ED PROVIDER NOTES
edema  Neuro:  A&O , no obvious neuro deficit with general inspection.  No facial asymmetry with testing.  Cerebellar test intact.  Ambulating normally.  Good strength and sensation, bilaterally equal with testing of upper and lower extremities.  Skin:  Warm, dry, no rash    Diagnostics:    Labs -     Recent Results (from the past 12 hour(s))   CBC with Auto Differential    Collection Time: 07/05/24 11:24 AM   Result Value Ref Range    WBC 4.9 4.6 - 13.2 K/uL    RBC 3.80 (L) 4.35 - 5.65 M/uL    Hemoglobin 12.6 (L) 13.0 - 16.0 g/dL    Hematocrit 38.7 36.0 - 48.0 %    .8 (H) 78.0 - 100.0 FL    MCH 33.2 24.0 - 34.0 PG    MCHC 32.6 31.0 - 37.0 g/dL    RDW 13.2 11.6 - 14.5 %    Platelets 175 135 - 420 K/uL    MPV 9.7 9.2 - 11.8 FL    Nucleated RBCs 0.0 0  WBC    nRBC 0.00 0.00 - 0.01 K/uL    Neutrophils % 62 40 - 73 %    Lymphocytes % 27 21 - 52 %    Monocytes % 8 3 - 10 %    Eosinophils % 2 0 - 5 %    Basophils % 0 0 - 2 %    Immature Granulocytes % 0 0.0 - 0.5 %    Neutrophils Absolute 3.1 1.8 - 8.0 K/UL    Lymphocytes Absolute 1.3 0.9 - 3.6 K/UL    Monocytes Absolute 0.4 0.05 - 1.2 K/UL    Eosinophils Absolute 0.1 0.0 - 0.4 K/UL    Basophils Absolute 0.0 0.0 - 0.1 K/UL    Immature Granulocytes Absolute 0.0 0.00 - 0.04 K/UL    Differential Type AUTOMATED     CMP    Collection Time: 07/05/24 11:24 AM   Result Value Ref Range    Sodium 139 136 - 145 mmol/L    Potassium 4.3 3.5 - 5.5 mmol/L    Chloride 106 100 - 111 mmol/L    CO2 28 21 - 32 mmol/L    Anion Gap 5 3.0 - 18 mmol/L    Glucose 92 74 - 99 mg/dL    BUN 16 7.0 - 18 MG/DL    Creatinine 1.11 0.6 - 1.3 MG/DL    BUN/Creatinine Ratio 14 12 - 20      Est, Glom Filt Rate 83 >60 ml/min/1.73m2    Calcium 9.2 8.5 - 10.1 MG/DL    Total Bilirubin 0.5 0.2 - 1.0 MG/DL    ALT 18 16 - 61 U/L    AST 10 10 - 38 U/L    Alk Phosphatase 61 45 - 117 U/L    Total Protein 6.5 6.4 - 8.2 g/dL    Albumin 3.6 3.4 - 5.0 g/dL    Globulin 2.9 2.0 - 4.0 g/dL    Albumin/Globulin  Ratio 1.2 0.8 - 1.7     Magnesium    Collection Time: 07/05/24 11:24 AM   Result Value Ref Range    Magnesium 2.0 1.6 - 2.6 mg/dL   TSH    Collection Time: 07/05/24 11:24 AM   Result Value Ref Range    TSH, 3rd Generation 0.44 0.36 - 3.74 uIU/mL   Urinalysis    Collection Time: 07/05/24 11:26 AM   Result Value Ref Range    Color, UA YELLOW      Appearance CLEAR      Specific Gravity, UA 1.013 1.005 - 1.030      pH, Urine 7.0 5.0 - 8.0      Protein, UA Negative NEG mg/dL    Glucose, Ur Negative NEG mg/dL    Ketones, Urine Negative NEG mg/dL    Bilirubin, Urine Negative NEG      Blood, Urine Negative NEG      Urobilinogen, Urine 1.0 0.2 - 1.0 EU/dL    Nitrite, Urine Negative NEG      Leukocyte Esterase, Urine Negative NEG         EKG: When ordered, EKG's are interpreted by the Emergency Department Provider in the absence of a cardiologist.  Please see their note for interpretation of EKG.      RADIOLOGY:  Non-plain film images such as CT, Ultrasound and MRI are read by the radiologist. Plain radiographic images are visualized and preliminarily interpreted by the ED.  Interpretation per the Radiologist below, if available at the time of this note:  No orders to display       Medications given in the ED-  Medications - No data to display    Please note that this dictation was completed with [employer], the computer voice recognition software.  Quite often unanticipated grammatical, syntax, homophones, and other interpretive errors are inadvertently transcribed by the computer software.  Please disregard these errors.  Please excuse any errors that have escaped final proofreading.       Janina Aaron PA-C  07/05/24 [id number]

## 2024-07-05 NOTE — DISCHARGE INSTRUCTIONS
Your workup today shows no obvious abnormality in your blood work.  No diabetes.  It is importantly follow-up with primary care outside the ER, for further evaluation and treatment.  Additional blood work such as B12 and vitamin D may be needed.  They can also discuss with you neurology consult as needed  Return to ER if any new or worsening symptoms or new concerns

## 2025-02-25 NOTE — DISCHARGE INSTRUCTIONS
Your EKG does not show any evidence of any cardiac disease. It is likely that your chest tightness is related to anxiety. Please call your psychiatrist for follow-up on Monday. We have provided you with Klonopin to help you sleep and reduce your racing thoughts related to anxiety. If you develop any thoughts of self-harm or hallucinations, please come back to the emergency department. Statement Selected